# Patient Record
Sex: MALE | Race: WHITE | Employment: FULL TIME | URBAN - METROPOLITAN AREA
[De-identification: names, ages, dates, MRNs, and addresses within clinical notes are randomized per-mention and may not be internally consistent; named-entity substitution may affect disease eponyms.]

---

## 2018-05-30 ENCOUNTER — OFFICE VISIT (OUTPATIENT)
Dept: FAMILY MEDICINE CLINIC | Facility: CLINIC | Age: 43
End: 2018-05-30

## 2018-05-30 VITALS
WEIGHT: 184 LBS | SYSTOLIC BLOOD PRESSURE: 122 MMHG | DIASTOLIC BLOOD PRESSURE: 74 MMHG | BODY MASS INDEX: 23.61 KG/M2 | HEART RATE: 76 BPM | OXYGEN SATURATION: 98 % | HEIGHT: 74 IN

## 2018-05-30 DIAGNOSIS — E78.2 MIXED HYPERLIPIDEMIA: ICD-10-CM

## 2018-05-30 DIAGNOSIS — I10 HYPERTENSION WITH GOAL BLOOD PRESSURE LESS THAN 140/90: Primary | ICD-10-CM

## 2018-05-30 DIAGNOSIS — F41.9 ANXIETY: ICD-10-CM

## 2018-05-30 DIAGNOSIS — Z87.891 PERSONAL HISTORY OF TOBACCO USE, PRESENTING HAZARDS TO HEALTH: ICD-10-CM

## 2018-05-30 PROBLEM — M19.071 ARTHRITIS OF RIGHT FOOT: Status: ACTIVE | Noted: 2018-05-30

## 2018-05-30 PROCEDURE — 99202 OFFICE O/P NEW SF 15 MIN: CPT | Performed by: FAMILY MEDICINE

## 2018-05-30 RX ORDER — HYDROCHLOROTHIAZIDE 12.5 MG/1
12.5 CAPSULE, GELATIN COATED ORAL
Refills: 2 | COMMUNITY
Start: 2018-05-15 | End: 2018-08-06

## 2018-05-30 RX ORDER — ALPRAZOLAM 0.5 MG/1
0.5 TABLET ORAL DAILY PRN
Qty: 14 TABLET | Refills: 0 | OUTPATIENT
Start: 2018-05-30 | End: 2019-04-14

## 2018-05-30 RX ORDER — ATORVASTATIN CALCIUM 10 MG/1
10 TABLET, FILM COATED ORAL
Refills: 11 | COMMUNITY
Start: 2018-05-11 | End: 2018-12-31

## 2018-05-30 RX ORDER — METOPROLOL SUCCINATE 50 MG/1
75 TABLET, EXTENDED RELEASE ORAL DAILY
Refills: 2 | COMMUNITY
Start: 2018-04-13 | End: 2018-10-30

## 2018-05-30 NOTE — PROGRESS NOTES
CC:  Patient presents with:  Establish Care      HPI: 37year old male smoker with hx of HTN, anxiety, and HLD here to establish care. Previous PCP was [facility] but switching jobs and will not be downtown as often.   Has been on Sertraline for Current Outpatient Prescriptions:  ALPRAZolam 0.5 MG Oral Tab Take 1 tablet (0.5 mg total) by mouth daily as needed for Anxiety. Disp: 14 tablet Rfl: 0   Metoprolol Succinate ER 50 MG Oral Tablet 24 Hr Take 75 mg by mouth daily.  Takes 1.5 tablets o provided, and will discuss cessation with therapist as well    Return in 1 month for physical or sooner as needed.      Gin Washington DO  05/30/18  8:47 AM

## 2018-07-09 ENCOUNTER — TELEPHONE (OUTPATIENT)
Dept: FAMILY MEDICINE CLINIC | Facility: CLINIC | Age: 43
End: 2018-07-09

## 2018-07-09 RX ORDER — METOPROLOL SUCCINATE 50 MG/1
TABLET, EXTENDED RELEASE ORAL
Qty: 45 TABLET | Refills: 0 | Status: SHIPPED | OUTPATIENT
Start: 2018-07-09 | End: 2018-10-06

## 2018-07-09 RX ORDER — HYDROCHLOROTHIAZIDE 12.5 MG/1
12.5 TABLET ORAL DAILY
Qty: 30 TABLET | Refills: 0 | Status: SHIPPED | OUTPATIENT
Start: 2018-07-09 | End: 2018-08-05

## 2018-07-09 RX ORDER — ATORVASTATIN CALCIUM 10 MG/1
10 TABLET, FILM COATED ORAL NIGHTLY
Qty: 30 TABLET | Refills: 0 | Status: SHIPPED | OUTPATIENT
Start: 2018-07-09 | End: 2018-11-27

## 2018-07-09 NOTE — TELEPHONE ENCOUNTER
Requesting refill on hydrochlorothiazide 12.5 mg, atorvastatin 10mg, sertaline 50 mg , metoprolol 50 mg

## 2018-07-09 NOTE — TELEPHONE ENCOUNTER
Pt requesting refills on HCTZ, Atorvastatin, Metoprolol and Sertraline. Sent in HCTZ, Atorvastatin and Metoprolol for 30 day supply of each per Dr. Elise Montes De Oca. Pt is due for a physical and labs. LM for pt letting him know. Dr. Elise Montes De Oca will send in Sertraline.

## 2018-08-06 RX ORDER — HYDROCHLOROTHIAZIDE 12.5 MG/1
TABLET ORAL
Qty: 60 TABLET | Refills: 0 | Status: SHIPPED | OUTPATIENT
Start: 2018-08-06 | End: 2018-10-04

## 2018-10-04 RX ORDER — HYDROCHLOROTHIAZIDE 12.5 MG/1
TABLET ORAL
Qty: 30 TABLET | Refills: 0 | Status: SHIPPED | OUTPATIENT
Start: 2018-10-04 | End: 2018-10-30

## 2018-10-08 RX ORDER — METOPROLOL SUCCINATE 50 MG/1
TABLET, EXTENDED RELEASE ORAL
Qty: 90 TABLET | Refills: 0 | Status: SHIPPED | OUTPATIENT
Start: 2018-10-08 | End: 2018-12-01

## 2018-10-30 RX ORDER — HYDROCHLOROTHIAZIDE 12.5 MG/1
TABLET ORAL
Qty: 30 TABLET | Refills: 0 | Status: SHIPPED | OUTPATIENT
Start: 2018-10-30 | End: 2018-12-01

## 2018-10-30 NOTE — TELEPHONE ENCOUNTER
Refill request rec'd for HCTZ 12.5mg/day. Per Dr. Reddy Dowling ok to send I #30 only. Pt to have labs drawn and schedule an appt for a physical. LM on pts personal VM informing him of above. Refill sent in for pt.

## 2018-11-27 ENCOUNTER — APPOINTMENT (OUTPATIENT)
Dept: LAB | Facility: REFERENCE LAB | Age: 43
End: 2018-11-27
Attending: FAMILY MEDICINE
Payer: COMMERCIAL

## 2018-11-27 ENCOUNTER — OFFICE VISIT (OUTPATIENT)
Dept: FAMILY MEDICINE CLINIC | Facility: CLINIC | Age: 43
End: 2018-11-27
Payer: COMMERCIAL

## 2018-11-27 VITALS
HEART RATE: 90 BPM | WEIGHT: 189 LBS | HEIGHT: 74 IN | BODY MASS INDEX: 24.26 KG/M2 | SYSTOLIC BLOOD PRESSURE: 138 MMHG | OXYGEN SATURATION: 98 % | DIASTOLIC BLOOD PRESSURE: 84 MMHG

## 2018-11-27 DIAGNOSIS — Z00.00 ENCOUNTER FOR ROUTINE ADULT HEALTH EXAMINATION WITHOUT ABNORMAL FINDINGS: ICD-10-CM

## 2018-11-27 DIAGNOSIS — Z00.00 ENCOUNTER FOR ROUTINE ADULT HEALTH EXAMINATION WITHOUT ABNORMAL FINDINGS: Primary | ICD-10-CM

## 2018-11-27 DIAGNOSIS — Z12.5 ENCOUNTER FOR PROSTATE CANCER SCREENING: ICD-10-CM

## 2018-11-27 DIAGNOSIS — I10 HYPERTENSION WITH GOAL BLOOD PRESSURE LESS THAN 140/90: ICD-10-CM

## 2018-11-27 DIAGNOSIS — E78.2 MIXED HYPERLIPIDEMIA: ICD-10-CM

## 2018-11-27 DIAGNOSIS — Z12.11 COLON CANCER SCREENING: ICD-10-CM

## 2018-11-27 PROCEDURE — 80053 COMPREHEN METABOLIC PANEL: CPT

## 2018-11-27 PROCEDURE — 99396 PREV VISIT EST AGE 40-64: CPT | Performed by: FAMILY MEDICINE

## 2018-11-27 PROCEDURE — 80061 LIPID PANEL: CPT

## 2018-11-27 PROCEDURE — 36415 COLL VENOUS BLD VENIPUNCTURE: CPT

## 2018-11-27 PROCEDURE — 83036 HEMOGLOBIN GLYCOSYLATED A1C: CPT

## 2018-11-27 NOTE — PROGRESS NOTES
Eva Alonzo is a 37year old male who presents for a complete physical exam.   HPI:     Concerns: None, doing very well with his anxiety levels since seeing a therapist in [facility].  Also cut back on smoking to 1 cigarette a day and very close to quitt Hypertension Father    • Heart Attack Father 72        CABG   • Arthritis Mother    • Cancer Maternal Grandmother         Stomach      Social History:  Social History    Tobacco Use      Smoking status: Current Every Day Smoker        Packs/day: 0.00 most days of the week   -Importance of regular exercise and weight maintenance   -Smoking cessation   -Diabetes screening which he desires  -Cholesterol screening which he desires  -Risks and benefits of Aspirin to prevent heart attacks and colon cancer

## 2018-11-29 DIAGNOSIS — Z12.5 PROSTATE CANCER SCREENING: ICD-10-CM

## 2018-11-29 DIAGNOSIS — R74.8 ELEVATED LIVER ENZYMES: ICD-10-CM

## 2018-11-29 DIAGNOSIS — E78.2 MIXED HYPERLIPIDEMIA: Primary | ICD-10-CM

## 2018-12-03 RX ORDER — METOPROLOL SUCCINATE 50 MG/1
TABLET, EXTENDED RELEASE ORAL
Qty: 90 TABLET | Refills: 0 | Status: SHIPPED | OUTPATIENT
Start: 2018-12-03 | End: 2019-02-16

## 2018-12-03 RX ORDER — HYDROCHLOROTHIAZIDE 12.5 MG/1
TABLET ORAL
Qty: 30 TABLET | Refills: 0 | Status: SHIPPED | OUTPATIENT
Start: 2018-12-03 | End: 2019-02-03

## 2018-12-17 ENCOUNTER — PATIENT MESSAGE (OUTPATIENT)
Dept: FAMILY MEDICINE CLINIC | Facility: CLINIC | Age: 43
End: 2018-12-17

## 2018-12-17 ENCOUNTER — TELEPHONE (OUTPATIENT)
Dept: FAMILY MEDICINE CLINIC | Facility: CLINIC | Age: 43
End: 2018-12-17

## 2018-12-17 NOTE — TELEPHONE ENCOUNTER
Patient calling with Pharmacy information states will like it be to be sent to  [address] [facility] on  [address] Telephone: [phone]

## 2018-12-17 NOTE — TELEPHONE ENCOUNTER
Sent pt a [employer] message (time difference in [place] is 6 hours) stating we will call the pharmacy tomorrow to try to get the medication called in for him, per [employer] [facility] protocol.

## 2018-12-17 NOTE — TELEPHONE ENCOUNTER
Patient is traveling in [place] right now and due to come back on Thursday. Patient forgot his METOPROLOL SUCCINATE ER 50 MG Oral Tablet 24 Hr at home and wants to know if he should tough it out or try to get it filled.

## 2018-12-17 NOTE — TELEPHONE ENCOUNTER
ANA for pt to contact the office with a phone # of a local pharmacy in [place] to call in his Metoprolol.

## 2018-12-18 NOTE — TELEPHONE ENCOUNTER
From: Keenan Bowie RN  To: Mirian Bledsoe  Sent: 12/17/2018 4:44 PM CST  Subject: prescription    Hi Mr. Princess Cordero    I wanted to let you know that we are trying to get your BP medication filled for you in [place].  We will call the [address] [place] [address],[address] number

## 2018-12-19 NOTE — PROGRESS NOTES
Phone call to patient to notify we are still attempting to contact the pharmacy. Pt stated one of his work colleagues has the exact same BP medication and dose and has extra he brought with him on their trip-.  Pt states he will use this and does not need a

## 2018-12-31 RX ORDER — ATORVASTATIN CALCIUM 10 MG/1
TABLET, FILM COATED ORAL
Qty: 90 TABLET | Refills: 0 | Status: SHIPPED | OUTPATIENT
Start: 2018-12-31 | End: 2019-04-04

## 2019-02-05 RX ORDER — HYDROCHLOROTHIAZIDE 12.5 MG/1
TABLET ORAL
Qty: 30 TABLET | Refills: 0 | Status: SHIPPED | OUTPATIENT
Start: 2019-02-05 | End: 2019-03-13

## 2019-02-20 RX ORDER — METOPROLOL SUCCINATE 50 MG/1
TABLET, EXTENDED RELEASE ORAL
Qty: 90 TABLET | Refills: 0 | Status: SHIPPED | OUTPATIENT
Start: 2019-02-20 | End: 2019-05-03

## 2019-03-08 RX ORDER — HYDROCHLOROTHIAZIDE 12.5 MG/1
TABLET ORAL
Qty: 30 TABLET | Refills: 0 | OUTPATIENT
Start: 2019-03-08

## 2019-03-08 NOTE — TELEPHONE ENCOUNTER
Spoke with pharmD who stated initially pharmacy could not refill Rx for Metoprolol till 3/19 but he checked again and is able to fill it today. Pt notified.  No refill needed for HCTZ per pt at this time

## 2019-03-14 RX ORDER — HYDROCHLOROTHIAZIDE 12.5 MG/1
TABLET ORAL
Qty: 30 TABLET | Refills: 0 | Status: SHIPPED | OUTPATIENT
Start: 2019-03-14 | End: 2019-04-14

## 2019-04-05 NOTE — TELEPHONE ENCOUNTER
Requested Prescriptions     Pending Prescriptions Disp Refills   • ATORVASTATIN 10 MG Oral Tab [Pharmacy Med Name: ATORVASTATIN 10 MG TABLET] 90 tablet 0     Sig: TAKE 1 TABLET BY MOUTH EVERY DAY AT NIGHT     LOV 11/27/2018    Last filled 12/31/2018   90 t

## 2019-04-06 RX ORDER — ATORVASTATIN CALCIUM 10 MG/1
TABLET, FILM COATED ORAL
Qty: 90 TABLET | Refills: 0 | Status: SHIPPED | OUTPATIENT
Start: 2019-04-06 | End: 2019-07-02

## 2019-04-15 RX ORDER — ALPRAZOLAM 0.5 MG/1
0.5 TABLET ORAL DAILY PRN
Qty: 14 TABLET | Refills: 0 | OUTPATIENT
Start: 2019-04-15 | End: 2019-07-02

## 2019-04-15 RX ORDER — HYDROCHLOROTHIAZIDE 12.5 MG/1
12.5 TABLET ORAL
Qty: 30 TABLET | Refills: 0 | Status: SHIPPED | OUTPATIENT
Start: 2019-04-15 | End: 2019-05-10

## 2019-05-04 RX ORDER — METOPROLOL SUCCINATE 50 MG/1
TABLET, EXTENDED RELEASE ORAL
Qty: 90 TABLET | Refills: 0 | Status: SHIPPED | OUTPATIENT
Start: 2019-05-04 | End: 2019-05-31

## 2019-05-09 NOTE — TELEPHONE ENCOUNTER
A refill request was received for:  Requested Prescriptions     Pending Prescriptions Disp Refills   • SERTRALINE HCL 50 MG Oral Tab [Pharmacy Med Name: SERTRALINE HCL 50 MG TABLET] 90 tablet 0     Sig: TAKE 1 TABLET BY MOUTH EVERY DAY     Last refill date

## 2019-05-10 RX ORDER — HYDROCHLOROTHIAZIDE 12.5 MG/1
TABLET ORAL
Qty: 30 TABLET | Refills: 0 | Status: SHIPPED | OUTPATIENT
Start: 2019-05-10 | End: 2019-06-09

## 2019-05-31 RX ORDER — METOPROLOL SUCCINATE 50 MG/1
TABLET, EXTENDED RELEASE ORAL
Qty: 90 TABLET | Refills: 0 | Status: SHIPPED | OUTPATIENT
Start: 2019-05-31 | End: 2019-07-02

## 2019-06-10 RX ORDER — HYDROCHLOROTHIAZIDE 12.5 MG/1
TABLET ORAL
Qty: 60 TABLET | Refills: 0 | Status: SHIPPED | OUTPATIENT
Start: 2019-06-10 | End: 2019-07-02

## 2019-07-02 DIAGNOSIS — I10 HYPERTENSION WITH GOAL BLOOD PRESSURE LESS THAN 140/90: Primary | ICD-10-CM

## 2019-07-02 RX ORDER — ATORVASTATIN CALCIUM 10 MG/1
TABLET, FILM COATED ORAL
Qty: 30 TABLET | Refills: 0 | Status: SHIPPED | OUTPATIENT
Start: 2019-07-02 | End: 2019-08-04

## 2019-07-02 RX ORDER — METOPROLOL SUCCINATE 50 MG/1
TABLET, EXTENDED RELEASE ORAL
Qty: 45 TABLET | Refills: 0 | Status: SHIPPED | OUTPATIENT
Start: 2019-07-02 | End: 2019-08-04

## 2019-07-02 NOTE — TELEPHONE ENCOUNTER
A refill request was received for:  Requested Prescriptions     Pending Prescriptions Disp Refills   • Metoprolol Succinate ER 50 MG Oral Tablet 24 Hr 90 tablet 0     Sig: Take 1 and 1/2 tabs po daily totalling 75mg     Last refill date: 5/31/2019  Qty:120

## 2019-07-03 RX ORDER — ALPRAZOLAM 0.5 MG/1
0.5 TABLET ORAL DAILY PRN
Qty: 12 TABLET | Refills: 0 | Status: SHIPPED
Start: 2019-07-03 | End: 2019-12-03

## 2019-07-03 RX ORDER — HYDROCHLOROTHIAZIDE 12.5 MG/1
12.5 TABLET ORAL
Qty: 60 TABLET | Refills: 0 | Status: SHIPPED | OUTPATIENT
Start: 2019-07-03 | End: 2019-08-15

## 2019-07-10 RX ORDER — METOPROLOL SUCCINATE 50 MG/1
TABLET, EXTENDED RELEASE ORAL
Qty: 45 TABLET | Refills: 0 | Status: SHIPPED | OUTPATIENT
Start: 2019-07-10 | End: 2019-12-03 | Stop reason: ALTCHOICE

## 2019-08-04 DIAGNOSIS — I10 HYPERTENSION WITH GOAL BLOOD PRESSURE LESS THAN 140/90: ICD-10-CM

## 2019-08-06 NOTE — TELEPHONE ENCOUNTER
A refill request was received for:  Requested Prescriptions     Pending Prescriptions Disp Refills   • Metoprolol Succinate ER 50 MG Oral Tablet 24 Hr [Pharmacy Med Name: METOPROLOL SUCC ER 50 MG TAB] 45 tablet 0     Sig: TAKE 1 AND 1/2 TABS BY MOUTH DAILY

## 2019-08-07 RX ORDER — ATORVASTATIN CALCIUM 10 MG/1
TABLET, FILM COATED ORAL
Qty: 90 TABLET | Refills: 0 | Status: SHIPPED | OUTPATIENT
Start: 2019-08-07 | End: 2019-08-15

## 2019-08-07 RX ORDER — METOPROLOL SUCCINATE 50 MG/1
TABLET, EXTENDED RELEASE ORAL
Qty: 135 TABLET | Refills: 0 | Status: SHIPPED | OUTPATIENT
Start: 2019-08-07 | End: 2019-08-15

## 2019-08-14 DIAGNOSIS — I10 HYPERTENSION WITH GOAL BLOOD PRESSURE LESS THAN 140/90: ICD-10-CM

## 2019-08-14 NOTE — TELEPHONE ENCOUNTER
Okay to fill all medications as requested and also schedule follow-up visit within the next 1-2 months. Please give enough to last until his visit.

## 2019-08-14 NOTE — TELEPHONE ENCOUNTER
Pt requesting refills of the following meds listed below. Explained to pt that he is overdue for f/u with [facility]. Pt stated he will call back this afternoon once he gets off the airplane to schedule appmnt.  explained to pt that this RN will check with [facility] r/t r

## 2019-08-15 RX ORDER — METOPROLOL SUCCINATE 50 MG/1
TABLET, EXTENDED RELEASE ORAL
Qty: 135 TABLET | Refills: 0 | Status: SHIPPED | OUTPATIENT
Start: 2019-08-15 | End: 2019-09-16

## 2019-08-15 RX ORDER — ATORVASTATIN CALCIUM 10 MG/1
TABLET, FILM COATED ORAL
Qty: 90 TABLET | Refills: 0 | Status: SHIPPED | OUTPATIENT
Start: 2019-08-15 | End: 2019-11-17

## 2019-08-15 RX ORDER — HYDROCHLOROTHIAZIDE 12.5 MG/1
12.5 TABLET ORAL
Qty: 90 TABLET | Refills: 0 | Status: SHIPPED | OUTPATIENT
Start: 2019-08-15 | End: 2019-12-03

## 2019-08-15 NOTE — TELEPHONE ENCOUNTER
Pt scheduled for 9/18/19 for f/u for BP and anxiety with [facility]. meds refilled incl:    Outpatient Medication Detail      Disp Refills Start End    ALPRAZolam 0.5 MG Oral Tab 12 tablet 0 7/3/2019     Sig - Route:  Take 1 tablet (0.5 mg total) by mouth daily as

## 2019-09-16 DIAGNOSIS — I10 HYPERTENSION WITH GOAL BLOOD PRESSURE LESS THAN 140/90: ICD-10-CM

## 2019-09-16 RX ORDER — METOPROLOL SUCCINATE 50 MG/1
TABLET, EXTENDED RELEASE ORAL
Qty: 135 TABLET | Refills: 0 | Status: SHIPPED | OUTPATIENT
Start: 2019-09-16 | End: 2019-12-03

## 2019-11-18 RX ORDER — ATORVASTATIN CALCIUM 10 MG/1
TABLET, FILM COATED ORAL
Qty: 90 TABLET | Refills: 0 | Status: SHIPPED | OUTPATIENT
Start: 2019-11-18 | End: 2019-12-05

## 2019-11-18 NOTE — TELEPHONE ENCOUNTER
A refill request was received for:  Requested Prescriptions     Pending Prescriptions Disp Refills   • atorvastatin 10 MG Oral Tab 90 tablet 0     Sig: TAKE 1 TABLET BY MOUTH EVERY DAY AT NIGHT   • Sertraline HCl 50 MG Oral Tab 90 tablet 0     Sig: Take 1

## 2019-12-03 ENCOUNTER — LAB ENCOUNTER (OUTPATIENT)
Dept: LAB | Facility: REFERENCE LAB | Age: 44
End: 2019-12-03
Attending: FAMILY MEDICINE
Payer: COMMERCIAL

## 2019-12-03 ENCOUNTER — OFFICE VISIT (OUTPATIENT)
Dept: FAMILY MEDICINE CLINIC | Facility: CLINIC | Age: 44
End: 2019-12-03
Payer: COMMERCIAL

## 2019-12-03 VITALS
HEIGHT: 74 IN | SYSTOLIC BLOOD PRESSURE: 138 MMHG | DIASTOLIC BLOOD PRESSURE: 86 MMHG | BODY MASS INDEX: 24.9 KG/M2 | HEART RATE: 80 BPM | WEIGHT: 194 LBS | OXYGEN SATURATION: 98 %

## 2019-12-03 DIAGNOSIS — Z30.09 VASECTOMY EVALUATION: ICD-10-CM

## 2019-12-03 DIAGNOSIS — Z00.01 ENCOUNTER FOR ROUTINE ADULT HEALTH EXAMINATION WITH ABNORMAL FINDINGS: Primary | ICD-10-CM

## 2019-12-03 DIAGNOSIS — Z23 NEED FOR VACCINATION: ICD-10-CM

## 2019-12-03 DIAGNOSIS — Z00.01 ENCOUNTER FOR ROUTINE ADULT HEALTH EXAMINATION WITH ABNORMAL FINDINGS: ICD-10-CM

## 2019-12-03 DIAGNOSIS — M19.071 ARTHRITIS OF RIGHT FOOT: ICD-10-CM

## 2019-12-03 DIAGNOSIS — I10 HYPERTENSION WITH GOAL BLOOD PRESSURE LESS THAN 140/90: ICD-10-CM

## 2019-12-03 DIAGNOSIS — F41.9 ANXIETY: ICD-10-CM

## 2019-12-03 PROCEDURE — 80061 LIPID PANEL: CPT

## 2019-12-03 PROCEDURE — 90686 IIV4 VACC NO PRSV 0.5 ML IM: CPT | Performed by: FAMILY MEDICINE

## 2019-12-03 PROCEDURE — 99396 PREV VISIT EST AGE 40-64: CPT | Performed by: FAMILY MEDICINE

## 2019-12-03 PROCEDURE — 83036 HEMOGLOBIN GLYCOSYLATED A1C: CPT

## 2019-12-03 PROCEDURE — 80053 COMPREHEN METABOLIC PANEL: CPT

## 2019-12-03 PROCEDURE — 99214 OFFICE O/P EST MOD 30 MIN: CPT | Performed by: FAMILY MEDICINE

## 2019-12-03 PROCEDURE — 36415 COLL VENOUS BLD VENIPUNCTURE: CPT

## 2019-12-03 PROCEDURE — 85025 COMPLETE CBC W/AUTO DIFF WBC: CPT

## 2019-12-03 PROCEDURE — 90471 IMMUNIZATION ADMIN: CPT | Performed by: FAMILY MEDICINE

## 2019-12-03 RX ORDER — HYDROCHLOROTHIAZIDE 12.5 MG/1
12.5 TABLET ORAL
Qty: 90 TABLET | Refills: 1 | Status: SHIPPED | OUTPATIENT
Start: 2019-12-03 | End: 2020-02-18

## 2019-12-03 RX ORDER — ALPRAZOLAM 0.5 MG/1
0.5 TABLET ORAL DAILY PRN
Qty: 30 TABLET | Refills: 0 | Status: SHIPPED | OUTPATIENT
Start: 2019-12-03 | End: 2019-12-24

## 2019-12-03 RX ORDER — METOPROLOL SUCCINATE 50 MG/1
TABLET, EXTENDED RELEASE ORAL
Qty: 135 TABLET | Refills: 1 | Status: SHIPPED | OUTPATIENT
Start: 2019-12-03 | End: 2019-12-24

## 2019-12-03 NOTE — PROGRESS NOTES
Mt Bentley is a 53year old male who presents for a complete physical exam.   HPI:     Concerns: Anxiety has been higher but has not been able to see his therapist as often and he is traveling more.  Does feel the Sertraline 50 mg daily is working wel TAKE 1 TABLET BY MOUTH EVERY DAY AT NIGHT 90 tablet 0      Past Medical History:   Diagnosis Date   • Arthritis     Right foot   • Essential hypertension    • High cholesterol    • Sarcoidosis 2008   • Stress       Past Surgical History:   Procedure Rhtet Ron research information, contact Dr. Karis Weber at [email or web address]. PRIME-MD® is a trademark of [address]. All rights reserved. Reproduced with permission.       EXAM:   Wt Readings from Last 6 Encounters:  12/03/19 : 194 lb (88 kg) OA. Also suggest Turmeric 1,000 mg twice a day instead of NSAID's for pain if possible. Anxiety okay and improving per patient. Needs to see his therapist more often and will continue Sertraline 50 mg daily for now.  Also given short-term refill of Alprazo Take 1 tablet (0.5 mg total) by mouth daily as needed for Anxiety. Imaging & Consults:  UROLOGY - INTERNAL  PODIATRY - INTERNAL  FLULAVAL INFLUENZA VACCINE QUAD PRESERVATIVE FREE 0.5 ML    Return in 1 year for annual physical or sooner as needed.

## 2019-12-03 NOTE — PATIENT INSTRUCTIONS
-Try taking Turmeric 1,000 mg twice a day  -Consider CoQ10 200 mg daily to help prevent muscle cramping with statin medication   Prevention Guidelines, Men Ages 36 to 52  Screening tests and vaccines are an important part of managing your health.  A screeni rectal exam (GERONIMO) and prostate-specific antigen (PSA) screening1 At routine exams   Syphilis Men at increased risk for infection – talk with your healthcare provider At routine exams   Tuberculosis Men at increased risk for infection – talk with your healt then Td every 10 years   Counseling Who needs it How often   Diet and exercise Men who are overweight or obese When diagnosed, and then at routine exams   Sexually transmitted infection prevention Men at increased risk for infection – talk with your health vegetables, soups, and fish not labeled as no-salt-added or reduced sodium  · Packaged gravies and sauces  · Olives, pickles, and relish  · Bottled salad dressings    For snacks and desserts  · Yogurt  · Unsalted, air-popped popcorn  · Unsalted nuts or see

## 2019-12-05 DIAGNOSIS — R79.89 ELEVATED LFTS: Primary | ICD-10-CM

## 2019-12-05 RX ORDER — ATORVASTATIN CALCIUM 20 MG/1
TABLET, FILM COATED ORAL
Qty: 90 TABLET | Refills: 1 | Status: SHIPPED | OUTPATIENT
Start: 2019-12-05 | End: 2020-02-18

## 2019-12-24 DIAGNOSIS — I10 HYPERTENSION WITH GOAL BLOOD PRESSURE LESS THAN 140/90: ICD-10-CM

## 2019-12-26 RX ORDER — ALPRAZOLAM 0.5 MG/1
0.5 TABLET ORAL DAILY PRN
Qty: 15 TABLET | Refills: 0 | Status: SHIPPED | OUTPATIENT
Start: 2019-12-26 | End: 2020-03-12

## 2019-12-26 RX ORDER — METOPROLOL SUCCINATE 50 MG/1
TABLET, EXTENDED RELEASE ORAL
Qty: 135 TABLET | Refills: 1 | Status: SHIPPED | OUTPATIENT
Start: 2019-12-26 | End: 2020-06-16

## 2020-02-17 RX ORDER — ATORVASTATIN CALCIUM 10 MG/1
TABLET, FILM COATED ORAL
Qty: 90 TABLET | Refills: 0 | OUTPATIENT
Start: 2020-02-17

## 2020-02-18 RX ORDER — HYDROCHLOROTHIAZIDE 12.5 MG/1
12.5 TABLET ORAL
Qty: 90 TABLET | Refills: 1 | Status: SHIPPED | OUTPATIENT
Start: 2020-02-18 | End: 2020-05-14

## 2020-02-18 RX ORDER — ATORVASTATIN CALCIUM 20 MG/1
TABLET, FILM COATED ORAL
Qty: 90 TABLET | Refills: 1 | Status: SHIPPED | OUTPATIENT
Start: 2020-02-18 | End: 2020-05-14

## 2020-03-13 RX ORDER — ALPRAZOLAM 0.5 MG/1
0.5 TABLET ORAL DAILY PRN
Qty: 30 TABLET | Refills: 0 | Status: SHIPPED | OUTPATIENT
Start: 2020-03-13 | End: 2020-11-19

## 2020-05-15 RX ORDER — HYDROCHLOROTHIAZIDE 12.5 MG/1
12.5 TABLET ORAL
Qty: 90 TABLET | Refills: 1 | Status: SHIPPED | OUTPATIENT
Start: 2020-05-15 | End: 2020-11-19

## 2020-05-15 RX ORDER — ATORVASTATIN CALCIUM 20 MG/1
TABLET, FILM COATED ORAL
Qty: 90 TABLET | Refills: 0 | Status: SHIPPED | OUTPATIENT
Start: 2020-05-15 | End: 2020-11-19

## 2020-05-15 NOTE — TELEPHONE ENCOUNTER
A refill request was received for:  Requested Prescriptions     Pending Prescriptions Disp Refills   • atorvastatin 20 MG Oral Tab 90 tablet 1     Sig: TAKE 1 TABLET BY MOUTH EVERY NIGHT     Signed Prescriptions Disp Refills   • Sertraline HCl 50 MG Oral T

## 2020-06-08 ENCOUNTER — TELEMEDICINE (OUTPATIENT)
Dept: FAMILY MEDICINE CLINIC | Facility: CLINIC | Age: 45
End: 2020-06-08
Payer: COMMERCIAL

## 2020-06-08 VITALS — HEART RATE: 70 BPM

## 2020-06-08 DIAGNOSIS — F41.9 ANXIETY: ICD-10-CM

## 2020-06-08 DIAGNOSIS — E78.2 MIXED HYPERLIPIDEMIA: ICD-10-CM

## 2020-06-08 DIAGNOSIS — I10 HYPERTENSION WITH GOAL BLOOD PRESSURE LESS THAN 140/90: ICD-10-CM

## 2020-06-08 DIAGNOSIS — R74.8 ELEVATED LIVER ENZYMES: Primary | ICD-10-CM

## 2020-06-08 PROCEDURE — 99213 OFFICE O/P EST LOW 20 MIN: CPT | Performed by: FAMILY MEDICINE

## 2020-06-08 NOTE — PATIENT INSTRUCTIONS
Controlling Your Cholesterol  Cholesterol is a waxy substance. It travels in your blood through the blood vessels. When you have high cholesterol, it can build up along the walls of the blood vessels.  This makes the vessels narrower and decreases blood f · Eat about two, 3.5 ounce servings of non-fried fish such as salmon, herring, sardines or mackerel per week . Most fish contain omega-3 fatty acids. These help lower total blood cholesterol.  Omega-3 fatty acids lowers triglyceride levels, another form of © 0431-1769 [address]. [address] [facility], [facility] [place]. All rights reserved. This information is not intended as a substitute for professional medical care. Always follow your healthcare professional's instructions.

## 2020-06-16 DIAGNOSIS — I10 HYPERTENSION WITH GOAL BLOOD PRESSURE LESS THAN 140/90: ICD-10-CM

## 2020-06-16 RX ORDER — METOPROLOL SUCCINATE 50 MG/1
TABLET, EXTENDED RELEASE ORAL
Qty: 135 TABLET | Refills: 1 | Status: SHIPPED | OUTPATIENT
Start: 2020-06-16 | End: 2020-07-03

## 2020-06-16 NOTE — TELEPHONE ENCOUNTER
A refill request was received for:  Requested Prescriptions     Pending Prescriptions Disp Refills   • METOPROLOL SUCCINATE ER 50 MG Oral Tablet 24 Hr [Pharmacy Med Name: METOPROLOL SUCC ER 50 MG TAB] 135 tablet 1     Sig: TAKE 1 AND 1/2 TABS BY MOUTH Jp Sanders

## 2020-07-03 DIAGNOSIS — I10 HYPERTENSION WITH GOAL BLOOD PRESSURE LESS THAN 140/90: ICD-10-CM

## 2020-07-04 RX ORDER — METOPROLOL SUCCINATE 50 MG/1
75 TABLET, EXTENDED RELEASE ORAL DAILY
Qty: 135 TABLET | Refills: 1 | Status: SHIPPED | OUTPATIENT
Start: 2020-07-04 | End: 2021-04-07

## 2020-07-04 NOTE — TELEPHONE ENCOUNTER
Received page requesting medication refill as he runs out tomorrow, even though it was refilled 6/16. Sent to [facility] on file again.

## 2020-08-14 ENCOUNTER — TELEPHONE (OUTPATIENT)
Dept: FAMILY MEDICINE CLINIC | Facility: CLINIC | Age: 45
End: 2020-08-14

## 2020-08-14 NOTE — TELEPHONE ENCOUNTER
Patient requesting refill on medications Metoprolol Succinate ER 50 MG Oral Tablet 24 Hr  Sertraline HCl 50 MG Oral Tab  atorvastatin 20 MG Oral Tab  hydrochlorothiazide 12.5 MG Oral Tab

## 2020-08-14 NOTE — TELEPHONE ENCOUNTER
Pt is needing refill       Metoprolol Succinate ER 50 MG Oral Tablet 24 Hr      [id number] [place], [place] - [address] AT [facility], [phone], [phone]

## 2020-08-14 NOTE — TELEPHONE ENCOUNTER
Called Pharmacy to verify medication refill needed and per pharmacy medication is too soon to fill. Insurance will not pay because it is too early. Called pt and informed of pharmacy's statement. Per pt will call the pharmacy.

## 2020-08-14 NOTE — TELEPHONE ENCOUNTER
Called pharmacy and pt has refills of all meds seen below including Atorvastatin.       Called pt and informed of refills, advised to check on the Atorvastatin, also provided appointment with Dr. Nasim Giron on Aug 20, 2020 at 0830 am  Pt verbalized understandin

## 2020-10-07 ENCOUNTER — TELEPHONE (OUTPATIENT)
Dept: FAMILY MEDICINE CLINIC | Facility: CLINIC | Age: 45
End: 2020-10-07

## 2020-10-07 NOTE — TELEPHONE ENCOUNTER
Metoprolol Succinate ER 50 MG Oral Tablet 24 Hr 135 tablet 1 7/4/2020    Sig:   Take 1.5 tablets (75 mg total) by mouth daily.      Route: Kirt Prima     Order #:   [id number]           Last visit 06/08/20 AS

## 2020-11-19 RX ORDER — ATORVASTATIN CALCIUM 20 MG/1
TABLET, FILM COATED ORAL
Qty: 90 TABLET | Refills: 0 | OUTPATIENT
Start: 2020-11-19

## 2020-11-19 RX ORDER — ALPRAZOLAM 0.5 MG/1
0.5 TABLET ORAL DAILY PRN
Qty: 30 TABLET | Refills: 0 | OUTPATIENT
Start: 2020-11-19

## 2020-11-19 RX ORDER — HYDROCHLOROTHIAZIDE 12.5 MG/1
12.5 TABLET ORAL
Qty: 90 TABLET | Refills: 1 | OUTPATIENT
Start: 2020-11-19

## 2020-11-20 RX ORDER — ALPRAZOLAM 0.5 MG/1
0.5 TABLET ORAL DAILY PRN
Qty: 30 TABLET | Refills: 0 | Status: SHIPPED | OUTPATIENT
Start: 2020-11-20 | End: 2021-10-12

## 2020-11-20 RX ORDER — HYDROCHLOROTHIAZIDE 12.5 MG/1
12.5 TABLET ORAL
Qty: 30 TABLET | Refills: 0 | Status: CANCELLED | OUTPATIENT
Start: 2020-11-20

## 2020-11-20 RX ORDER — ALPRAZOLAM 0.5 MG/1
0.5 TABLET ORAL DAILY PRN
Qty: 30 TABLET | Refills: 0 | Status: CANCELLED | OUTPATIENT
Start: 2020-11-20

## 2020-11-20 RX ORDER — ATORVASTATIN CALCIUM 20 MG/1
TABLET, FILM COATED ORAL
Qty: 30 TABLET | Refills: 0 | Status: SHIPPED | OUTPATIENT
Start: 2020-11-20

## 2020-11-20 RX ORDER — ATORVASTATIN CALCIUM 20 MG/1
TABLET, FILM COATED ORAL
Qty: 30 TABLET | Refills: 0 | Status: CANCELLED | OUTPATIENT
Start: 2020-11-20

## 2020-11-20 RX ORDER — HYDROCHLOROTHIAZIDE 12.5 MG/1
12.5 TABLET ORAL
Qty: 30 TABLET | Refills: 0 | Status: SHIPPED | OUTPATIENT
Start: 2020-11-20 | End: 2020-12-21

## 2020-11-20 NOTE — TELEPHONE ENCOUNTER
Called patient ans left a voicemail for him to set up an appointment for a physical before he runs out of medication again

## 2020-11-20 NOTE — TELEPHONE ENCOUNTER
Patient is needing refill         ALPRAZolam 0.5 MG Oral Tab      Sertraline HCl 50 MG Oral Tab      hydrochlorothiazide 12.5 MG Oral Tab      atorvastatin 20 MG Oral Tab        [id number]- [place], [place] - [address] [facility] AT [address] [place] [address],

## 2020-11-20 NOTE — TELEPHONE ENCOUNTER
Pt requesting refill on        ALPRAZolam 0.5 MG Oral Tab        Sertraline HCl 50 MG Oral Tab        hydrochlorothiazide 12.5 MG Oral Tab        atorvastatin 20 MG Oral Tab      MD filled rx for 30 days, requesting to see patient and complete labs before

## 2020-12-16 ENCOUNTER — TELEPHONE (OUTPATIENT)
Dept: FAMILY MEDICINE CLINIC | Facility: CLINIC | Age: 45
End: 2020-12-16

## 2020-12-16 RX ORDER — ATORVASTATIN CALCIUM 20 MG/1
TABLET, FILM COATED ORAL
Qty: 30 TABLET | Refills: 0 | OUTPATIENT
Start: 2020-12-16

## 2020-12-21 ENCOUNTER — TELEPHONE (OUTPATIENT)
Dept: FAMILY MEDICINE CLINIC | Facility: CLINIC | Age: 45
End: 2020-12-21

## 2020-12-21 DIAGNOSIS — Z00.01 ENCOUNTER FOR ROUTINE ADULT HEALTH EXAMINATION WITH ABNORMAL FINDINGS: Primary | ICD-10-CM

## 2020-12-21 RX ORDER — HYDROCHLOROTHIAZIDE 12.5 MG/1
TABLET ORAL
Qty: 90 TABLET | Refills: 1 | OUTPATIENT
Start: 2020-12-21

## 2020-12-21 RX ORDER — HYDROCHLOROTHIAZIDE 12.5 MG/1
12.5 TABLET ORAL
Qty: 30 TABLET | Refills: 0 | Status: SHIPPED | OUTPATIENT
Start: 2020-12-21 | End: 2021-03-17

## 2020-12-21 NOTE — TELEPHONE ENCOUNTER
Patient requesting refill on atorvastatin 20 MG Oral Tab,   hydrochlorothiazide 12.5 MG Oral Tab,Sertraline HCl 50 MG Oral Tab

## 2020-12-21 NOTE — TELEPHONE ENCOUNTER
Please notify patient I refilled HCTZ and Sertraline for 30 days only but can not refill statin until he completes his blood work. His liver enzymes were high a year ago and could be dangerous to keep him on the statin without checking these.  As soon as he completes the blood work I can refill but please also schedule him for his physical. I also ordered the other labs he would need for his physical.

## 2021-03-17 RX ORDER — HYDROCHLOROTHIAZIDE 12.5 MG/1
12.5 TABLET ORAL DAILY
Qty: 60 TABLET | Refills: 0 | Status: SHIPPED | OUTPATIENT
Start: 2021-03-17 | End: 2021-05-11

## 2021-04-07 DIAGNOSIS — I10 HYPERTENSION WITH GOAL BLOOD PRESSURE LESS THAN 140/90: ICD-10-CM

## 2021-04-07 RX ORDER — METOPROLOL SUCCINATE 50 MG/1
75 TABLET, EXTENDED RELEASE ORAL DAILY
Qty: 45 TABLET | Refills: 0 | Status: SHIPPED | OUTPATIENT
Start: 2021-04-07 | End: 2021-05-11

## 2021-04-12 RX ORDER — HYDROCHLOROTHIAZIDE 12.5 MG/1
TABLET ORAL
Qty: 30 TABLET | Refills: 0 | OUTPATIENT
Start: 2021-04-12

## 2021-04-21 ENCOUNTER — HOSPITAL ENCOUNTER (OUTPATIENT)
Age: 46
Discharge: HOME | End: 2021-04-21
Payer: COMMERCIAL

## 2021-04-21 ENCOUNTER — APPOINTMENT (OUTPATIENT)
Dept: GENERAL RADIOLOGY | Age: 46
End: 2021-04-21
Attending: EMERGENCY MEDICINE
Payer: COMMERCIAL

## 2021-04-21 VITALS
TEMPERATURE: 98 F | HEART RATE: 76 BPM | BODY MASS INDEX: 25.67 KG/M2 | HEIGHT: 74 IN | WEIGHT: 200 LBS | DIASTOLIC BLOOD PRESSURE: 90 MMHG | SYSTOLIC BLOOD PRESSURE: 130 MMHG | RESPIRATION RATE: 18 BRPM | OXYGEN SATURATION: 100 %

## 2021-04-21 DIAGNOSIS — I10 ESSENTIAL HYPERTENSION: ICD-10-CM

## 2021-04-21 DIAGNOSIS — Z86.39 HISTORY OF HYPERLIPIDEMIA: ICD-10-CM

## 2021-04-21 DIAGNOSIS — M79.602 ARM PAIN, ANTERIOR, LEFT: Primary | ICD-10-CM

## 2021-04-21 PROCEDURE — 99203 OFFICE O/P NEW LOW 30 MIN: CPT | Performed by: EMERGENCY MEDICINE

## 2021-04-21 PROCEDURE — 93000 ELECTROCARDIOGRAM COMPLETE: CPT | Performed by: EMERGENCY MEDICINE

## 2021-04-21 PROCEDURE — 71046 X-RAY EXAM CHEST 2 VIEWS: CPT | Performed by: EMERGENCY MEDICINE

## 2021-04-21 PROCEDURE — 84484 ASSAY OF TROPONIN QUANT: CPT | Performed by: EMERGENCY MEDICINE

## 2021-04-21 PROCEDURE — 85025 COMPLETE CBC W/AUTO DIFF WBC: CPT | Performed by: EMERGENCY MEDICINE

## 2021-04-21 PROCEDURE — 81002 URINALYSIS NONAUTO W/O SCOPE: CPT | Performed by: EMERGENCY MEDICINE

## 2021-04-21 PROCEDURE — 80047 BASIC METABLC PNL IONIZED CA: CPT | Performed by: EMERGENCY MEDICINE

## 2021-04-21 NOTE — ED INITIAL ASSESSMENT (HPI)
Per pt having 2 days of left arm pain reports last night woke him up. Pt denies any injury, pain mostly upper inside of arm but radiates around arm. Pt states one week ago was heavy things. detailed exam

## 2021-04-21 NOTE — ED PROVIDER NOTES
Patient Seen in: [facility]      History   Patient presents with:  Arm Pain    Stated Complaint: PAIN LEFT ARM    HPI/Subjective:   Bianka Hyatt is a 48year old male here for left arm pain that started 2 days ago.   Medical history inclu Positive for stated complaint: PAIN LEFT ARM  Other systems are as noted in HPI. Constitutional and vital signs reviewed. All other systems reviewed and negative except as noted above.     Physical Exam     ED Triage Vitals [04/21/21 0820]   BP (!) 14 Ketone, Urine Trace (*)     All other components within normal limits   POCT ISTAT CHEM8 CARTRIDGE - Abnormal; Notable for the following components:    ISTAT Glucose 112 (*)     All other components within normal limits   ISTAT TROPONIN - Normal   POCT CB Range    ISTAT Troponin <0.02 <0.045 ng/mL ng/mL       Reviewed  EKG    Rate, intervals and axes as noted on EKG Report. Rate: 74  Rhythm: Sinus Rhythm  Reading:  WNL            Reviewed  XR CHEST PA + LAT CHEST (CPT=71046)    Result Date: 4/21/2021  CONCL trouble sleeping due to the pain. I suggested nonscheduled medications of melatonin 1 mg, or Benadryl 25 mg. If patient needs anything stronger he can contact his primary care physician. He is to rest, and ice his arm.   Avoid heat, or strenuous activity

## 2021-05-11 DIAGNOSIS — I10 HYPERTENSION WITH GOAL BLOOD PRESSURE LESS THAN 140/90: ICD-10-CM

## 2021-05-11 RX ORDER — METOPROLOL SUCCINATE 50 MG/1
75 TABLET, EXTENDED RELEASE ORAL DAILY
Qty: 90 TABLET | Refills: 0 | Status: SHIPPED | OUTPATIENT
Start: 2021-05-11 | End: 2021-07-08

## 2021-05-11 RX ORDER — HYDROCHLOROTHIAZIDE 12.5 MG/1
12.5 TABLET ORAL DAILY
Qty: 60 TABLET | Refills: 0 | Status: SHIPPED | OUTPATIENT
Start: 2021-05-11 | End: 2021-06-11

## 2021-05-11 NOTE — TELEPHONE ENCOUNTER
A refill request was received for:  Requested Prescriptions     Pending Prescriptions Disp Refills   • HYDROCHLOROTHIAZIDE 12.5 MG Oral Tab [Pharmacy Med Name: HYDROCHLOROTHIAZIDE 12.5 MG TB] 30 tablet 1     Sig: TAKE 1 TABLET BY MOUTH EVERY DAY   • SERTRA

## 2021-05-26 ENCOUNTER — OFFICE VISIT (OUTPATIENT)
Dept: FAMILY MEDICINE CLINIC | Facility: CLINIC | Age: 46
End: 2021-05-26
Payer: COMMERCIAL

## 2021-05-26 ENCOUNTER — LABORATORY ENCOUNTER (OUTPATIENT)
Dept: LAB | Facility: REFERENCE LAB | Age: 46
End: 2021-05-26
Attending: FAMILY MEDICINE
Payer: COMMERCIAL

## 2021-05-26 VITALS
HEART RATE: 76 BPM | DIASTOLIC BLOOD PRESSURE: 86 MMHG | HEIGHT: 74 IN | WEIGHT: 190 LBS | SYSTOLIC BLOOD PRESSURE: 128 MMHG | OXYGEN SATURATION: 98 % | BODY MASS INDEX: 24.38 KG/M2

## 2021-05-26 DIAGNOSIS — Z12.11 COLON CANCER SCREENING: ICD-10-CM

## 2021-05-26 DIAGNOSIS — Z00.00 ENCOUNTER FOR ROUTINE ADULT HEALTH EXAMINATION WITHOUT ABNORMAL FINDINGS: ICD-10-CM

## 2021-05-26 DIAGNOSIS — R74.8 ELEVATED LIVER ENZYMES: ICD-10-CM

## 2021-05-26 DIAGNOSIS — E78.2 MIXED HYPERLIPIDEMIA: ICD-10-CM

## 2021-05-26 DIAGNOSIS — Z00.00 ENCOUNTER FOR ROUTINE ADULT HEALTH EXAMINATION WITHOUT ABNORMAL FINDINGS: Primary | ICD-10-CM

## 2021-05-26 DIAGNOSIS — F41.9 ANXIETY: ICD-10-CM

## 2021-05-26 DIAGNOSIS — I10 HYPERTENSION WITH GOAL BLOOD PRESSURE LESS THAN 140/90: ICD-10-CM

## 2021-05-26 DIAGNOSIS — Z00.01 ENCOUNTER FOR ROUTINE ADULT HEALTH EXAMINATION WITH ABNORMAL FINDINGS: ICD-10-CM

## 2021-05-26 PROCEDURE — 81003 URINALYSIS AUTO W/O SCOPE: CPT

## 2021-05-26 PROCEDURE — 3074F SYST BP LT 130 MM HG: CPT | Performed by: FAMILY MEDICINE

## 2021-05-26 PROCEDURE — 80061 LIPID PANEL: CPT

## 2021-05-26 PROCEDURE — 80053 COMPREHEN METABOLIC PANEL: CPT

## 2021-05-26 PROCEDURE — 85025 COMPLETE CBC W/AUTO DIFF WBC: CPT

## 2021-05-26 PROCEDURE — 3079F DIAST BP 80-89 MM HG: CPT | Performed by: FAMILY MEDICINE

## 2021-05-26 PROCEDURE — 83036 HEMOGLOBIN GLYCOSYLATED A1C: CPT

## 2021-05-26 PROCEDURE — 99396 PREV VISIT EST AGE 40-64: CPT | Performed by: FAMILY MEDICINE

## 2021-05-26 PROCEDURE — 36415 COLL VENOUS BLD VENIPUNCTURE: CPT

## 2021-05-26 PROCEDURE — 3008F BODY MASS INDEX DOCD: CPT | Performed by: FAMILY MEDICINE

## 2021-05-26 PROCEDURE — 86803 HEPATITIS C AB TEST: CPT

## 2021-05-26 NOTE — PROGRESS NOTES
Tirso Stafford is a 48year old male who presents for a complete physical exam.   HPI:     Concerns: Needs to work on his alcohol intake as he has been drinking more and wants to be done with drinking.  Interested in seeing a therapist to help him to cut by mouth daily. 90 tablet 0   • ALPRAZolam 0.5 MG Oral Tab Take 1 tablet (0.5 mg total) by mouth daily as needed for Anxiety.  30 tablet 0   • atorvastatin 20 MG Oral Tab TAKE 1 TABLET BY MOUTH EVERY NIGHT (Patient not taking: Reported on 5/26/2021 ) 30 tab Primary Care Evaluation of Mental Disorders Patient Health Questionnaire (PRIME-MD PHQ). The PHQ was developed by Ashvin Taylor, Dorothy Najera and colleagues. For research information, contact Dr. Albert Stewart at [email or web address]. [E]      Hemoglobin A1C (Glycohemoglobin) [E]      CBC W Differential W Platelet [E]      Comp Metabolic Panel (14) [E]      Urinalysis, Routine [E][If pt <2 yrs old, must also order Reducing Substance ([id number])]    Referral to [facility] given to help ma

## 2021-05-26 NOTE — PATIENT INSTRUCTIONS
Prevention Guidelines, Men Ages 36 to 52  Screening tests and vaccines are an important part of managing your health. A screening test is done to find possible disorders or diseases in people who don't have any symptoms.  The goal is to find a disease ear talk with your healthcare provider At routine exams   Tuberculosis Men at increased risk for infection – talk with your healthcare provider Check with your healthcare provider   Vision All men in this age group Every 2 to 4 years if no risk factors for eye then at routine exams   Sexually transmitted infection prevention Men at increased risk for infection – talk with your healthcare provider At routine exams   Use of daily aspirin Men ages 39 to 78 at risk for cardiovascular health problems At routine exams cholesterol is high, follow the steps below to help lower your total cholesterol level:  Eat less unhealthy fat  · Cut back on saturated fats and trans fats (also called hydrogenated) by selecting lean cuts of meat, low-fat dairy, and using oils instead of to a safe level. Medicine to lower cholesterol levels is effective and safe. Taking medicine is not a substitute for exercise or watching your diet! Your healthcare provider can tell you whether you might benefit from a cholesterol-lowering medicine.   Stay

## 2021-06-11 RX ORDER — HYDROCHLOROTHIAZIDE 12.5 MG/1
12.5 TABLET ORAL DAILY
Qty: 90 TABLET | Refills: 1 | Status: SHIPPED | OUTPATIENT
Start: 2021-06-11 | End: 2021-10-12

## 2021-06-11 NOTE — TELEPHONE ENCOUNTER
A refill request was received for:  Requested Prescriptions     Pending Prescriptions Disp Refills   • HYDROCHLOROTHIAZIDE 12.5 MG Oral Tab [Pharmacy Med Name: HYDROCHLOROTHIAZIDE 12.5 MG TB] 30 tablet 1     Sig: TAKE 1 TABLET BY MOUTH EVERY DAY     Last r

## 2021-06-12 NOTE — TELEPHONE ENCOUNTER
A refill request was received for:  Requested Prescriptions     Pending Prescriptions Disp Refills   • SERTRALINE HCL 50 MG Oral Tab [Pharmacy Med Name: SERTRALINE HCL 50 MG TABLET] 30 tablet 1     Sig: TAKE 1 TABLET BY MOUTH EVERY DAY     Last refill date

## 2021-07-08 DIAGNOSIS — I10 HYPERTENSION WITH GOAL BLOOD PRESSURE LESS THAN 140/90: ICD-10-CM

## 2021-07-08 RX ORDER — METOPROLOL SUCCINATE 50 MG/1
75 TABLET, EXTENDED RELEASE ORAL DAILY
Qty: 135 TABLET | Refills: 1 | Status: SHIPPED | OUTPATIENT
Start: 2021-07-08 | End: 2021-10-12

## 2021-10-12 DIAGNOSIS — I10 HYPERTENSION WITH GOAL BLOOD PRESSURE LESS THAN 140/90: ICD-10-CM

## 2021-10-12 RX ORDER — ALPRAZOLAM 0.5 MG/1
0.5 TABLET ORAL DAILY PRN
Qty: 30 TABLET | Refills: 0 | Status: SHIPPED | OUTPATIENT
Start: 2021-10-12

## 2021-10-12 RX ORDER — HYDROCHLOROTHIAZIDE 12.5 MG/1
12.5 TABLET ORAL DAILY
Qty: 90 TABLET | Refills: 1 | Status: SHIPPED | OUTPATIENT
Start: 2021-10-12

## 2021-10-12 RX ORDER — METOPROLOL SUCCINATE 50 MG/1
75 TABLET, EXTENDED RELEASE ORAL DAILY
Qty: 135 TABLET | Refills: 1 | Status: SHIPPED | OUTPATIENT
Start: 2021-10-12

## 2021-10-12 NOTE — TELEPHONE ENCOUNTER
A refill request was received for:  Requested Prescriptions     Pending Prescriptions Disp Refills   • ALPRAZolam 0.5 MG Oral Tab 30 tablet 0     Sig: Take 1 tablet (0.5 mg total) by mouth daily as needed for Anxiety.    • hydrochlorothiazide 12.5 MG Oral T

## 2022-04-11 RX ORDER — METOPROLOL SUCCINATE 50 MG/1
75 TABLET, EXTENDED RELEASE ORAL DAILY
Qty: 135 TABLET | Refills: 0 | Status: SHIPPED | OUTPATIENT
Start: 2022-04-11

## 2022-04-11 NOTE — TELEPHONE ENCOUNTER
Please review. Protocol failed or has no protocol.     Requested Prescriptions   Pending Prescriptions Disp Refills    METOPROLOL SUCCINATE ER 50 MG Oral Tablet 24 Hr [Pharmacy Med Name: METOPROLOL SUCC ER 50 MG TAB] 135 tablet 1     Sig: TAKE 1 AND 1/2 TABLETS BY MOUTH DAILY        Hypertensive Medications Protocol Failed - 4/10/2022  7:24 AM        Failed - Appointment in past 6 or next 3 months        Passed - CMP or BMP in past 12 months        Passed - GFR Non- > 50     Lab Results   Component Value Date    GFRNAA 108 05/26/2021                       Recent Outpatient Visits              10 months ago Encounter for routine adult health examination without abnormal findings    [address] [place]    Office Visit    1 year ago Elevated liver enzymes    [address] [place] [address], [address] [place] [address]    2 years ago Encounter for routine adult health examination with abnormal findings    [address] [place]    Office Visit    3 years ago Encounter for routine adult health examination without abnormal findings    [address] [place]    Office Visit    3 years ago Hypertension with goal blood pressure less than 140/90    [facility], [address], [place], [place]    Office Visit

## 2022-05-30 RX ORDER — HYDROCHLOROTHIAZIDE 12.5 MG/1
12.5 TABLET ORAL DAILY
Qty: 90 TABLET | Refills: 0 | Status: SHIPPED | OUTPATIENT
Start: 2022-05-30

## 2022-05-30 NOTE — TELEPHONE ENCOUNTER
Please review; protocol failed/no protocol.      Requested Prescriptions   Pending Prescriptions Disp Refills    HYDROCHLOROTHIAZIDE 12.5 MG Oral Tab [Pharmacy Med Name: HYDROCHLOROTHIAZIDE 12.5 MG TB] 90 tablet 1     Sig: TAKE 1 TABLET BY MOUTH EVERY DAY        Hypertensive Medications Protocol Failed - 5/30/2022  9:16 AM        Failed - CMP or BMP in past 12 months        Passed - Appointment in past 6 or next 3 months        Passed - GFR Non- > 50     Lab Results   Component Value Date    GFRNAA 108 05/26/2021                        Recent Outpatient Visits              1 year ago Encounter for routine adult health examination without abnormal findings    [address], [place]    Office Visit    1 year ago Elevated liver enzymes    [address] [facility], [address] [facility], [address] [place] [address]    2 years ago Encounter for routine adult health examination with abnormal findings    [address], [place]    Office Visit    3 years ago Encounter for routine adult health examination without abnormal findings    [address], [place]    Office Visit    4 years ago Hypertension with goal blood pressure less than 140/90    [employer], [address], [place], [facility] Visit             Future Appointments         Provider Department Appt Notes    In 2 days Cayetano Wilkes, [address] [facility], [address]

## 2022-06-01 ENCOUNTER — OFFICE VISIT (OUTPATIENT)
Dept: FAMILY MEDICINE CLINIC | Facility: CLINIC | Age: 47
End: 2022-06-01
Payer: COMMERCIAL

## 2022-06-01 ENCOUNTER — LAB ENCOUNTER (OUTPATIENT)
Dept: LAB | Facility: REFERENCE LAB | Age: 47
End: 2022-06-01
Attending: FAMILY MEDICINE
Payer: COMMERCIAL

## 2022-06-01 VITALS
BODY MASS INDEX: 24.77 KG/M2 | WEIGHT: 193 LBS | HEIGHT: 74 IN | DIASTOLIC BLOOD PRESSURE: 84 MMHG | HEART RATE: 73 BPM | SYSTOLIC BLOOD PRESSURE: 124 MMHG | OXYGEN SATURATION: 97 %

## 2022-06-01 DIAGNOSIS — Z12.5 PROSTATE CANCER SCREENING: ICD-10-CM

## 2022-06-01 DIAGNOSIS — I10 HYPERTENSION WITH GOAL BLOOD PRESSURE LESS THAN 140/90: ICD-10-CM

## 2022-06-01 DIAGNOSIS — M19.071 ARTHRITIS OF RIGHT FOOT: ICD-10-CM

## 2022-06-01 DIAGNOSIS — F41.9 ANXIETY: ICD-10-CM

## 2022-06-01 DIAGNOSIS — F10.10 ALCOHOL ABUSE: ICD-10-CM

## 2022-06-01 DIAGNOSIS — E78.2 MIXED HYPERLIPIDEMIA: ICD-10-CM

## 2022-06-01 DIAGNOSIS — R74.8 ELEVATED LIVER ENZYMES: ICD-10-CM

## 2022-06-01 DIAGNOSIS — Z00.00 ENCOUNTER FOR ROUTINE ADULT HEALTH EXAMINATION WITHOUT ABNORMAL FINDINGS: Primary | ICD-10-CM

## 2022-06-01 DIAGNOSIS — Z12.11 COLON CANCER SCREENING: ICD-10-CM

## 2022-06-01 DIAGNOSIS — M77.12 LATERAL EPICONDYLITIS OF LEFT ELBOW: ICD-10-CM

## 2022-06-01 DIAGNOSIS — Z00.00 ENCOUNTER FOR ROUTINE ADULT HEALTH EXAMINATION WITHOUT ABNORMAL FINDINGS: ICD-10-CM

## 2022-06-01 LAB
ALBUMIN SERPL-MCNC: 4.1 G/DL (ref 3.4–5)
ALBUMIN/GLOB SERPL: 1.1 {RATIO} (ref 1–2)
ALP LIVER SERPL-CCNC: 95 U/L
ALT SERPL-CCNC: 123 U/L
ANION GAP SERPL CALC-SCNC: 8 MMOL/L (ref 0–18)
AST SERPL-CCNC: 70 U/L (ref 15–37)
BASOPHILS # BLD AUTO: 0.06 X10(3) UL (ref 0–0.2)
BASOPHILS NFR BLD AUTO: 1.4 %
BILIRUB SERPL-MCNC: 0.6 MG/DL (ref 0.1–2)
BUN BLD-MCNC: 11 MG/DL (ref 7–18)
BUN/CREAT SERPL: 12.5 (ref 10–20)
CALCIUM BLD-MCNC: 9.5 MG/DL (ref 8.5–10.1)
CHLORIDE SERPL-SCNC: 103 MMOL/L (ref 98–112)
CHOLEST SERPL-MCNC: 275 MG/DL (ref ?–200)
CO2 SERPL-SCNC: 27 MMOL/L (ref 21–32)
COMPLEXED PSA SERPL-MCNC: 1.51 NG/ML (ref ?–4)
CREAT BLD-MCNC: 0.88 MG/DL
CREAT UR-SCNC: 122 MG/DL
DEPRECATED RDW RBC AUTO: 42.6 FL (ref 35.1–46.3)
EOSINOPHIL # BLD AUTO: 0.18 X10(3) UL (ref 0–0.7)
EOSINOPHIL NFR BLD AUTO: 4.3 %
ERYTHROCYTE [DISTWIDTH] IN BLOOD BY AUTOMATED COUNT: 11.8 % (ref 11–15)
EST. AVERAGE GLUCOSE BLD GHB EST-MCNC: 97 MG/DL (ref 68–126)
FASTING PATIENT LIPID ANSWER: YES
FASTING STATUS PATIENT QL REPORTED: YES
GLOBULIN PLAS-MCNC: 3.8 G/DL (ref 2.8–4.4)
GLUCOSE BLD-MCNC: 82 MG/DL (ref 70–99)
HBA1C MFR BLD: 5 % (ref ?–5.7)
HCT VFR BLD AUTO: 44.7 %
HDLC SERPL-MCNC: 98 MG/DL (ref 40–59)
HGB BLD-MCNC: 14.8 G/DL
IMM GRANULOCYTES # BLD AUTO: 0.01 X10(3) UL (ref 0–1)
IMM GRANULOCYTES NFR BLD: 0.2 %
LDLC SERPL CALC-MCNC: 158 MG/DL (ref ?–100)
LYMPHOCYTES # BLD AUTO: 1.58 X10(3) UL (ref 1–4)
LYMPHOCYTES NFR BLD AUTO: 37.8 %
MCH RBC QN AUTO: 32.6 PG (ref 26–34)
MCHC RBC AUTO-ENTMCNC: 33.1 G/DL (ref 31–37)
MCV RBC AUTO: 98.5 FL
MICROALBUMIN UR-MCNC: 1.46 MG/DL
MICROALBUMIN/CREAT 24H UR-RTO: 12 UG/MG (ref ?–30)
MONOCYTES # BLD AUTO: 0.53 X10(3) UL (ref 0.1–1)
MONOCYTES NFR BLD AUTO: 12.7 %
NEUTROPHILS # BLD AUTO: 1.82 X10 (3) UL (ref 1.5–7.7)
NEUTROPHILS # BLD AUTO: 1.82 X10(3) UL (ref 1.5–7.7)
NEUTROPHILS NFR BLD AUTO: 43.6 %
NONHDLC SERPL-MCNC: 177 MG/DL (ref ?–130)
OSMOLALITY SERPL CALC.SUM OF ELEC: 284 MOSM/KG (ref 275–295)
PLATELET # BLD AUTO: 239 10(3)UL (ref 150–450)
POTASSIUM SERPL-SCNC: 3.7 MMOL/L (ref 3.5–5.1)
PROT SERPL-MCNC: 7.9 G/DL (ref 6.4–8.2)
RBC # BLD AUTO: 4.54 X10(6)UL
SODIUM SERPL-SCNC: 138 MMOL/L (ref 136–145)
TRIGL SERPL-MCNC: 111 MG/DL (ref 30–149)
VLDLC SERPL CALC-MCNC: 21 MG/DL (ref 0–30)
WBC # BLD AUTO: 4.2 X10(3) UL (ref 4–11)

## 2022-06-01 PROCEDURE — 3008F BODY MASS INDEX DOCD: CPT | Performed by: FAMILY MEDICINE

## 2022-06-01 PROCEDURE — 99396 PREV VISIT EST AGE 40-64: CPT | Performed by: FAMILY MEDICINE

## 2022-06-01 PROCEDURE — 3074F SYST BP LT 130 MM HG: CPT | Performed by: FAMILY MEDICINE

## 2022-06-01 PROCEDURE — 82570 ASSAY OF URINE CREATININE: CPT

## 2022-06-01 PROCEDURE — 99214 OFFICE O/P EST MOD 30 MIN: CPT | Performed by: FAMILY MEDICINE

## 2022-06-01 PROCEDURE — 85025 COMPLETE CBC W/AUTO DIFF WBC: CPT

## 2022-06-01 PROCEDURE — 3079F DIAST BP 80-89 MM HG: CPT | Performed by: FAMILY MEDICINE

## 2022-06-01 PROCEDURE — 36415 COLL VENOUS BLD VENIPUNCTURE: CPT

## 2022-06-01 PROCEDURE — 83036 HEMOGLOBIN GLYCOSYLATED A1C: CPT

## 2022-06-01 PROCEDURE — 80053 COMPREHEN METABOLIC PANEL: CPT

## 2022-06-01 PROCEDURE — 80061 LIPID PANEL: CPT

## 2022-06-01 PROCEDURE — 82043 UR ALBUMIN QUANTITATIVE: CPT

## 2022-06-01 RX ORDER — ALPRAZOLAM 0.5 MG/1
0.5 TABLET ORAL DAILY PRN
Qty: 30 TABLET | Refills: 0 | Status: SHIPPED | OUTPATIENT
Start: 2022-06-01

## 2022-06-02 DIAGNOSIS — R74.8 ELEVATED LIVER ENZYMES: Primary | ICD-10-CM

## 2022-07-13 DIAGNOSIS — I10 HYPERTENSION WITH GOAL BLOOD PRESSURE LESS THAN 140/90: ICD-10-CM

## 2022-07-13 RX ORDER — METOPROLOL SUCCINATE 50 MG/1
75 TABLET, EXTENDED RELEASE ORAL DAILY
Qty: 135 TABLET | Refills: 1 | Status: SHIPPED | OUTPATIENT
Start: 2022-07-13

## 2022-08-29 RX ORDER — HYDROCHLOROTHIAZIDE 12.5 MG/1
12.5 TABLET ORAL DAILY
Qty: 90 TABLET | Refills: 1 | Status: SHIPPED | OUTPATIENT
Start: 2022-08-29

## 2022-08-29 NOTE — TELEPHONE ENCOUNTER
Refill passed per [facility]u protocol.     .  Requested Prescriptions   Pending Prescriptions Disp Refills    HYDROCHLOROTHIAZIDE 12.5 MG Oral Tab [Pharmacy Med Name: HYDROCHLOROTHIAZIDE 12.5 MG TB] 90 tablet 0     Sig: TAKE 1 TABLET BY MOUTH EVERY DAY        Hypertensive Medications Protocol Passed - 8/28/2022  1:16 PM        Passed - In person appointment in the past 12 or next 3 months       Recent Outpatient Visits              2 months ago Encounter for routine adult health examination without abnormal findings    [address], [place]    Office Visit    1 year ago Encounter for routine adult health examination without abnormal findings    [address], [facility] [address] Visit    2 years ago Elevated liver enzymes    [address] [facility], [address] [place] [address]    2 years ago Encounter for routine adult health examination with abnormal findings    [address], [address] Visit    3 years ago Encounter for routine adult health examination without abnormal findings    [address], [address] [place], [place]    Office Visit                 Passed - Last BP reading less than 140/90     BP Readings from Last 1 Encounters:  06/01/22 : 124/84                Passed - CMP or BMP in past 6 months     Recent Results (from the past 4392 hour(s))   COMP METABOLIC PANEL (14)    Collection Time: 06/01/22  8:46 AM   Result Value Ref Range    Glucose 82 70 - 99 mg/dL    Sodium 138 136 - 145 mmol/L    Potassium 3.7 3.5 - 5.1 mmol/L    Chloride 103 98 - 112 mmol/L    CO2 27.0 21.0 - 32.0 mmol/L    Anion Gap 8 0 - 18 mmol/L    BUN 11 7 - 18 mg/dL    Creatinine 0.88 0.70 - 1.30 mg/dL    BUN/CREA Ratio 12.5 10.0 - 20.0    Calcium, Total 9.5 8.5 - 10.1 mg/dL    Calculated Osmolality 284 275 - 295 mOsm/kg    GFR, Non- 101 >=60    GFR, -American 117 >=60     (H) 16 - 61 U/L    AST 70 (H) 15 - 37 U/L    Alkaline Phosphatase 95 45 - 117 U/L    Bilirubin, Total 0.6 0.1 - 2.0 mg/dL    Total Protein 7.9 6.4 - 8.2 g/dL    Albumin 4.1 3.4 - 5.0 g/dL    Globulin  3.8 2.8 - 4.4 g/dL    A/G Ratio 1.1 1.0 - 2.0    Patient Fasting for CMP? Yes      *Note: Due to a large number of results and/or encounters for the requested time period, some results have not been displayed. A complete set of results can be found in Results Review.                  Passed - In person appointment or virtual visit in the past 6 months       Recent Outpatient Visits              2 months ago Encounter for routine adult health examination without abnormal findings    [facility]    [facility]    1 year ago Encounter for routine adult health examination without abnormal findings    [facility]    [facility] Visit    2 years ago Elevated liver enzymes    [facility], 1[facility], [address] [place] [address]    2 years ago Encounter for routine adult health examination with abnormal findings    [address], [facility] Visit    3 years ago Encounter for routine adult health examination without abnormal findings    [facility]    [facility] Visit                 Passed - GFR > 50     No results found for: [facility]                    Recent Outpatient Visits              2 months ago Encounter for routine adult health examination without abnormal findings    [facility]    [facility] Visit    1 year ago Encounter for routine adult health examination without abnormal findings    [facility], [place] , [place]    Office Visit    2 years ago Elevated liver enzymes    [facility], [address] [place] [address]    Telemedicine    2 years ago Encounter for routine adult health examination with abnormal findings    [address], [place]    Office Visit    3 years ago Encounter for routine adult health examination without abnormal findings    [facility], [place], [place]    Office Visit

## 2023-01-07 DIAGNOSIS — I10 HYPERTENSION WITH GOAL BLOOD PRESSURE LESS THAN 140/90: ICD-10-CM

## 2023-01-09 RX ORDER — METOPROLOL SUCCINATE 50 MG/1
TABLET, EXTENDED RELEASE ORAL
Qty: 135 TABLET | Refills: 1 | Status: SHIPPED | OUTPATIENT
Start: 2023-01-09

## 2023-02-28 RX ORDER — HYDROCHLOROTHIAZIDE 12.5 MG/1
12.5 TABLET ORAL DAILY
Qty: 90 TABLET | Refills: 0 | Status: SHIPPED | OUTPATIENT
Start: 2023-02-28

## 2023-02-28 NOTE — TELEPHONE ENCOUNTER
Please review. Protocol Failed or has No Protocol. Requested Prescriptions   Pending Prescriptions Disp Refills    HYDROCHLOROTHIAZIDE 12.5 MG Oral Tab [Pharmacy Med Name: HYDROCHLOROTHIAZIDE 12.5 MG TB] 90 tablet 1     Sig: TAKE 1 TABLET BY MOUTH EVERY DAY       Hypertensive Medications Protocol Failed - 2/25/2023  8:52 AM        Failed - CMP or BMP in past 6 months     No results found for this or any previous visit (from the past 4392 hour(s)).             Failed - In person appointment or virtual visit in the past 6 months     Recent Outpatient Visits              9 months ago Encounter for routine adult health examination without abnormal findings    [address], [place]    Office Visit    1 year ago Encounter for routine adult health examination without abnormal findings    [facility], 1199 [facility], 1013 [facility] Visit    2 years ago Elevated liver enzymes    [facility], [address], 1199 [facility], [id number] [place] [address]    3 years ago Encounter for routine adult health examination with abnormal findings    [facility], 1199 [facility], [address] [place] Visit    4 years ago Encounter for routine adult health examination without abnormal findings    Man[address]inder Blanc [address] - In person appointment in the past 12 or next 3 months     Recent Outpatient Visits              9 months ago Encounter for routine adult health examination without abnormal findings    [address], [place]    Office Visit    1 year ago Encounter for routine adult health examination without abnormal findings    [address], [place]    Office Visit    2 years ago Elevated liver enzymes    [facility], [address], [address] [place], [place]    Telemedicine    3 years ago Encounter for routine adult health examination with abnormal findings    [facility] [address] [place] Visit    4 years ago Encounter for routine adult health examination without abnormal findings    [facility], [place]    Office Visit                      Passed - Last BP reading less than 140/90     BP Readings from Last 1 Encounters:  06/01/22 : 124/84              Passed - EGFRCR or GFRNAA > 50     GFR Evaluation  GFRNAA: 101 , resulted on 6/1/2022                   Recent Outpatient Visits              9 months ago Encounter for routine adult health examination without abnormal findings    [facility], [place]    Office Visit    1 year ago Encounter for routine adult health examination without abnormal findings    [facility], [facility] [place], [place]    Office Visit    2 years ago Elevated liver enzymes    [facility], [address], 1199 [facility], [address] [place] [address]    3 years ago Encounter for routine adult health examination with abnormal findings    [facility], 1199 [facility], [address] [place] Visit    4 years ago Encounter for routine adult health examination without abnormal findings    [facility], [facility] [place], [place]    Office Visit

## 2023-03-18 NOTE — TELEPHONE ENCOUNTER
Please review. Protocol Failed or has no Protocol    Requested Prescriptions   Pending Prescriptions Disp Refills    ALPRAZolam 0.5 MG Oral Tab 30 tablet 0     Sig: Take 1 tablet (0.5 mg total) by mouth daily as needed for Anxiety.        There is no refill protocol information for this order

## 2023-03-19 RX ORDER — ALPRAZOLAM 0.5 MG/1
0.5 TABLET ORAL DAILY PRN
Qty: 30 TABLET | Refills: 0 | Status: SHIPPED | OUTPATIENT
Start: 2023-03-19

## 2023-04-24 ENCOUNTER — TELEMEDICINE (OUTPATIENT)
Facility: CLINIC | Age: 48
End: 2023-04-24
Payer: COMMERCIAL

## 2023-04-24 VITALS — BODY MASS INDEX: 25 KG/M2 | HEIGHT: 74 IN

## 2023-04-24 DIAGNOSIS — L30.9 ECZEMA, UNSPECIFIED TYPE: Primary | ICD-10-CM

## 2023-04-24 DIAGNOSIS — R74.8 ELEVATED LIVER ENZYMES: ICD-10-CM

## 2023-04-24 DIAGNOSIS — F41.9 ANXIETY: ICD-10-CM

## 2023-04-24 DIAGNOSIS — L21.9 SEBORRHEIC DERMATITIS: ICD-10-CM

## 2023-04-24 RX ORDER — ALCLOMETASONE DIPROPIONATE 0.5 MG/G
1 OINTMENT TOPICAL 2 TIMES DAILY
Qty: 60 G | Refills: 0 | Status: SHIPPED | OUTPATIENT
Start: 2023-04-24

## 2023-04-24 RX ORDER — KETOCONAZOLE 20 MG/ML
SHAMPOO TOPICAL
Qty: 120 ML | Refills: 0 | Status: SHIPPED | OUTPATIENT
Start: 2023-04-24 | End: 2023-07-23

## 2023-05-08 LAB — AMB EXT COVID-19 RESULT: DETECTED

## 2023-05-09 ENCOUNTER — PATIENT MESSAGE (OUTPATIENT)
Facility: CLINIC | Age: 48
End: 2023-05-09

## 2023-05-10 NOTE — TELEPHONE ENCOUNTER
From: Samara Coleman  To: Elle Leyva DO  Sent: 5/9/2023 4:11 PM CDT  Subject: Covid    Hi - two updates. Been about two weeks since I started the skin med, and my arms/shoulders, etc are great. Scalp - not so much. Little better, but not much movement. Not sure what to do? Also, was traveling for work last week and started feeling really bad Thursday. Friday morning woke up with fever, aches, chills, etc. Loaded up on sudafed and ibuprofen. Finally tested Monday and turned up positive for Covid. On the mend, just thought you should know.      thanks

## 2023-05-12 NOTE — TELEPHONE ENCOUNTER
Dr. Luzmaria Soni, please advise. Medication on backorder.      Alclometasone Dipropionate 0.05 % External Ointment

## 2023-05-22 RX ORDER — KETOCONAZOLE 20 MG/ML
SHAMPOO TOPICAL
Qty: 120 ML | Refills: 0 | Status: SHIPPED | OUTPATIENT
Start: 2023-05-22 | End: 2023-08-20

## 2023-05-22 NOTE — TELEPHONE ENCOUNTER
Please review. Protocol failed / Has no protocol. Requested Prescriptions   Pending Prescriptions Disp Refills    KETOCONAZOLE 2 % External Shampoo [Pharmacy Med Name: KETOCONAZOLE 2% SHAMPOO] 120 mL 0     Sig: Apply 1 mL topically daily for 14 days, THEN 1 mL twice a week.        There is no refill protocol information for this order        Future Appointments         Provider Department Appt Notes    In 1 month Stanislav Loco DO [address], [address], [facility] general health           Recent Outpatient Visits              4 weeks ago Eczema, unspecified type    [address] [facility], [address] [place], [place]    Telemedicine    11 months ago Encounter for routine adult health examination without abnormal findings    Man[address] Carey [address], [place]    Office Visit    1 year ago Encounter for routine adult health examination without abnormal findings    [address] [facility], [address] [place], [place]    Office Visit    2 years ago Elevated liver enzymes    [facility], [address], [address] [facility], [address] [place] [address]    3 years ago Encounter for routine adult health examination with abnormal findings    [address], [address], [address] [place], [place]    Office Visit

## 2023-06-02 RX ORDER — HYDROCHLOROTHIAZIDE 12.5 MG/1
TABLET ORAL
Qty: 90 TABLET | Refills: 0 | Status: SHIPPED | OUTPATIENT
Start: 2023-06-02

## 2023-06-02 NOTE — TELEPHONE ENCOUNTER
Please review. Protocol failed     Future Appointments   Date Time Provider Aryan Rivero   6/27/2023  8:30 AM Kerry Sun DO [facility] 14 FP [facility] 10 OP          Requested Prescriptions   Pending Prescriptions Disp Refills    HYDROCHLOROTHIAZIDE 12.5 MG Oral Tab [Pharmacy Med Name: HYDROCHLOROTHIAZIDE 12.5 MG TB] 90 tablet 0     Sig: TAKE 1 TABLET BY MOUTH EVERY DAY       Hypertensive Medications Protocol Failed - 6/2/2023  1:32 PM        Failed - CMP or BMP in past 6 months     No results found for this or any previous visit (from the past 4392 hour(s)).               Failed - EGFRCR or GFRNAA > 50     GFR Evaluation              Passed - In person appointment in the past 12 or next 3 months     Recent Outpatient Visits              1 month ago Eczema, unspecified type    Myles Anderson, [facility], [place]    Telemedicine    1 year ago Encounter for routine adult health examination without abnormal findings    [address], [address] [place] Visit    2 years ago Encounter for routine adult health examination without abnormal findings    [address], [address] [place] Visit    2 years ago Elevated liver enzymes    [facility], [address], [place], [address] [facility], [id number] [place] [address]    3 years ago Encounter for routine adult health examination with abnormal findings    [address], [address] [place] Visit          Future Appointments         Provider Department Appt Notes    In 3 weeks DO Myles Valdez, [address] [facility] - Last BP reading less than 140/90     BP Readings from Last 1 Encounters:  06/01/22 : 124/84                Passed - In person appointment or virtual visit in the past 6 months     Recent Outpatient Visits              1 month ago Eczema, unspecified type    [address], [address], [place]    Telemedicine    1 year ago Encounter for routine adult health examination without abnormal findings    Manjinder [address], [place]    Office Visit    2 years ago Encounter for routine adult health examination without abnormal findings    [address], [address], [address] [place] [place] Visit    2 years ago Elevated liver enzymes    [facility], [address], [address], [address]    3 years ago Encounter for routine adult health examination with abnormal findings    [address], [address], [place]    Office Visit          Future Appointments         Provider Department Appt Notes    In 3 weeks Anny Rm DO Roselie Lav, [address], [address]                  Future Appointments         Provider Department Appt Notes    In 3 weeks Anny Rm DO Roselie Lav, [address], [address] [facility] Visits              1 month ago Eczema, unspecified type    [address], [address], [address]    1 year ago Encounter for routine adult health examination without abnormal findings    [address], [address], [place]    Office Visit    2 years ago Encounter for routine adult health examination without abnormal findings    [address], [address], [address] [place] Visit    2 years ago Elevated liver enzymes    [facility], [address], [address], [address] [facility]    3 years ago Encounter for routine adult health examination with abnormal findings    [place] [facility], [address], [address], [place]    Office Visit

## 2023-07-20 DIAGNOSIS — I10 HYPERTENSION WITH GOAL BLOOD PRESSURE LESS THAN 140/90: ICD-10-CM

## 2023-07-20 RX ORDER — METOPROLOL SUCCINATE 50 MG/1
75 TABLET, EXTENDED RELEASE ORAL DAILY
Qty: 135 TABLET | Refills: 0 | Status: SHIPPED | OUTPATIENT
Start: 2023-07-20

## 2023-07-20 NOTE — TELEPHONE ENCOUNTER
Please review. Protocol failed / Has no protocol. Last visit was Telemedicine visit on 4/24/23. Requested Prescriptions   Pending Prescriptions Disp Refills    METOPROLOL SUCCINATE ER 50 MG Oral Tablet 24 Hr [Pharmacy Med Name: METOPROLOL SUCC ER 50 MG TAB] 135 tablet 1     Sig: TAKE 1 AND 1/2 TABLETS DAILY BY MOUTH       Hypertensive Medications Protocol Failed - 7/20/2023  1:16 AM        Failed - In person appointment in the past 12 or next 3 months     Recent Outpatient Visits              2 months ago Eczema, unspecified type    [address] [place] [address], [address] [place] [address]    Telemedicine    1 year ago Encounter for routine adult health examination without abnormal findings    [address] [place] [address]    Office Visit    2 years ago Encounter for routine adult health examination without abnormal findings    [address] [place] [address], [address] [place] [address], [address] [place] [address] Visit    3 years ago Elevated liver enzymes    [facility], [address], [address] [place] [address]    3 years ago Encounter for routine adult health examination with abnormal findings    [address], [place]    Office Visit                      Failed - CMP or BMP in past 6 months     No results found for this or any previous visit (from the past 4392 hour(s)).             Failed - EGFRCR or GFRNAA > 50     GFR Evaluation            Passed - Last BP reading less than 140/90     BP Readings from Last 1 Encounters:  06/01/22 : 124/84              Passed - In person appointment or virtual visit in the past 6 months     Recent Outpatient Visits              2 months ago Eczema, unspecified type    [address] [place] [address]    Telemedicine    1 year ago Encounter for routine adult health examination without abnormal findings Manjinder Wood De [facility], [place]    Office Visit    2 years ago Encounter for routine adult health examination without abnormal findings    [address], [address] [place] Visit    3 years ago Elevated liver enzymes    [facility], [address], [address] [place], [place]    Telemedicine    3 years ago Encounter for routine adult health examination with abnormal findings    [address] [place], [address] [place], [place]    Office Visit                            Recent Outpatient Visits              2 months ago Eczema, unspecified type    [address] [place], [address] [place], [place]    Telemedicine    1 year ago Encounter for routine adult health examination without abnormal findings    [address], [address], [address] [place], [place]    Office Visit    2 years ago Encounter for routine adult health examination without abnormal findings    [place] [place], [address], [address] [place] Visit    3 years ago Elevated liver enzymes    [facility], [address], [address], [address] [place] [address]    3 years ago Encounter for routine adult health examination with abnormal findings    [address], [address], [address] [place], [place]    Office Visit

## 2023-08-31 RX ORDER — HYDROCHLOROTHIAZIDE 12.5 MG/1
12.5 TABLET ORAL DAILY
Qty: 90 TABLET | Refills: 0 | Status: SHIPPED | OUTPATIENT
Start: 2023-08-31

## 2023-10-04 ENCOUNTER — PATIENT MESSAGE (OUTPATIENT)
Facility: CLINIC | Age: 48
End: 2023-10-04

## 2023-10-04 DIAGNOSIS — E78.2 MIXED HYPERLIPIDEMIA: Primary | ICD-10-CM

## 2023-10-04 DIAGNOSIS — R74.8 ELEVATED LIVER ENZYMES: ICD-10-CM

## 2023-10-05 NOTE — TELEPHONE ENCOUNTER
Dr. Teresa Banda    Please see patient 's recent MyChart messages below and result note from 6/1/22  Also noted GI referral was given on 4/6  but no OV noted    Future Appointments   Date Time Provider Aryan Rivero   1/9/2024  1:00 PM Joe Arias, DO [facility] 14 FP [facility] 10 OP         Comp Metabolic Panel (14) [E]: Patient Communication     Edit Comments   Add Notifications     Hi Riky,     Your LDL and total cholesterol levels are still quite high, though slightly better than last year. Your liver enzymes are also elevated, but they have improved. You would likely benefit from going back on a statin to lower your cholesterol levels and hopefully improve your liver enzymes, but I also want you to schedule the liver ultrasound as soon as possible. You can call [phone] to schedule. We can then talk about next steps with your medication. I also forgot to mention that there are medications that can help decrease the desire to drink alcohol, so we can talk about those options as well. Talk to you soon. Dr. Robert Wright Em Triage Support (supporting Sha Walker DO)23 hours ago (5:09 PM)     [facility]  Also, could someone give me the contact information for the liver scan and the colorectal screening again? Asia PERES Em Triage Support (supporting Sha Walker DO)23 hours ago (5:05 PM)     Becky Banda -   Just scheduled my physical - hadnt realized the scheduling had gotten that bad. I'm out in January, so will probably need a meds refill before then (nothing now). I also have a medical question about alcohol use/abuse where I can use some input. Let me know the best way to get about it?

## 2023-10-18 DIAGNOSIS — I10 HYPERTENSION WITH GOAL BLOOD PRESSURE LESS THAN 140/90: ICD-10-CM

## 2023-10-19 RX ORDER — METOPROLOL SUCCINATE 50 MG/1
75 TABLET, EXTENDED RELEASE ORAL DAILY
Qty: 135 TABLET | Refills: 0 | Status: SHIPPED | OUTPATIENT
Start: 2023-10-19

## 2023-10-19 NOTE — TELEPHONE ENCOUNTER
Please review; protocol failed. No active /future labs noted   Please see message below for upcoming appointment. Future Appointments   Date Time Provider Aryan Rivero   1/9/2024  1:00 PM Anny mR DO [facility] 14 FP [facility] 10 OP       Requested Prescriptions   Pending Prescriptions Disp Refills    METOPROLOL SUCCINATE ER 50 MG Oral Tablet 24 Hr [Pharmacy Med Name: METOPROLOL SUCC ER 50 MG TAB] 135 tablet 0     Sig: TAKE 1 AND 1/2 TABLETS BY MOUTH DAILY       Hypertensive Medications Protocol Failed - 10/18/2023 12:34 PM        Failed - CMP or BMP in past 6 months     No results found for this or any previous visit (from the past 4392 hour(s)).             Failed - EGFRCR or GFRNAA > 50     GFR Evaluation            Passed - In person appointment in the past 12 or next 3 months     Recent Outpatient Visits              5 months ago Eczema, unspecified type    [address], [address] [place], [place]    Telemedicine    1 year ago Encounter for routine adult health examination without abnormal findings    [address], [address] [place], [place]    Office Visit    2 years ago Encounter for routine adult health examination without abnormal findings    [address], [address], [address] [place] Visit    3 years ago Elevated liver enzymes    [facility], [facility], [facility], [address] [place] [address]    3 years ago Encounter for routine adult health examination with abnormal findings    [address], [address], [address] [place] Visit          Future Appointments         Provider Department Appt Notes    In 2 months Anny Rm DO [address], [address] - Last BP reading less than 140/90     BP Readings from Last 1 Encounters:  06/01/22 : 124/84              Passed - In person appointment or virtual visit in the past 6 months     Recent Outpatient Visits              5 months ago Eczema, unspecified type    [facility], [address], [place]    Telemedicine    1 year ago Encounter for routine adult health examination without abnormal findings    Manjinder [address], [place]    Office Visit    2 years ago Encounter for routine adult health examination without abnormal findings    [facility], [address], [address] [place] Visit    3 years ago Elevated liver enzymes    [facility], [address], [address], [address]    3 years ago Encounter for routine adult health examination with abnormal findings    [facility] [facility], [address], [place]    Office Visit          Future Appointments         Provider Department Appt Notes    In 2 months Hoa Eugene DO [address], [address], [place] Visits              5 months ago Eczema, unspecified type    [facility], [address], [address]    1 year ago Encounter for routine adult health examination without abnormal findings    [address], [address], [address], [address] [place] Visit    2 years ago Encounter for routine adult health examination without abnormal findings    [facility], [address], [address] [place] Visit    3 years ago Elevated liver enzymes    [facility], [address], [address], [address]    3 years ago Encounter for routine adult health examination with abnormal findings    [address], [address], [address], [address] [place] Visit          Future Appointments         Provider Department Appt Notes    In 2 months Rosa Moerira  [facility]

## 2023-10-22 RX ORDER — ALPRAZOLAM 0.5 MG/1
0.5 TABLET ORAL DAILY PRN
Qty: 30 TABLET | Refills: 0 | Status: SHIPPED | OUTPATIENT
Start: 2023-10-22

## 2023-10-24 ENCOUNTER — HOSPITAL ENCOUNTER (OUTPATIENT)
Age: 48
Discharge: HOME | End: 2023-10-24

## 2023-10-24 PROCEDURE — 90686 IIV4 VACC NO PRSV 0.5 ML IM: CPT | Performed by: EMERGENCY MEDICINE

## 2023-10-24 PROCEDURE — 90471 IMMUNIZATION ADMIN: CPT | Performed by: EMERGENCY MEDICINE

## 2023-11-30 RX ORDER — HYDROCHLOROTHIAZIDE 12.5 MG/1
12.5 TABLET ORAL DAILY
Qty: 90 TABLET | Refills: 0 | Status: SHIPPED | OUTPATIENT
Start: 2023-11-30

## 2023-11-30 NOTE — TELEPHONE ENCOUNTER
Please review; protocol failed. No active /future labs noted   Please see message below for upcoming appointment. Future Appointments   Date Time Provider Aryan Rivero   1/9/2024  1:00 PM Refugio Alonzo DO [facility] FP [facility] OP       Requested Prescriptions   Pending Prescriptions Disp Refills    HYDROCHLOROTHIAZIDE 12.5 MG Oral Tab [Pharmacy Med Name: HYDROCHLOROTHIAZIDE 12.5 MG TB] 90 tablet 0     Sig: TAKE 1 TABLET BY MOUTH EVERY DAY       Hypertensive Medications Protocol Failed - 11/28/2023  1:31 PM        Failed - CMP or BMP in past 6 months     No results found for this or any previous visit (from the past 4392 hour(s)).             Failed - In person appointment or virtual visit in the past 6 months     Recent Outpatient Visits              7 months ago Eczema, unspecified type    Lizbeth Hartmann [facility], [address] [facility]    1 year ago Encounter for routine adult health examination without abnormal findings    Arenas Dunker, [address], [place]    Office Visit    2 years ago Encounter for routine adult health examination without abnormal findings    i[address]zbeth [address], [facility] [address] [place] Visit    3 years ago Elevated liver enzymes    [facility], [address], Geovanna Becker [facility], [address] [facility]    3 years ago Encounter for routine adult health examination with abnormal findings    Lizbeth [address], [address] Visit          Future Appointments         Provider Department Appt Notes    In 1 month Deepa Montano [place] [address] or GFRNAA > 50     GFR Evaluation            Passed - In person appointment in the past 12 or next 3 months     Recent Outpatient Visits              7 months ago Eczema, unspecified type Manjinder Israel [facility], [place]    Telemedicine    1 year ago Encounter for routine adult health examination without abnormal findings    [facility] [place]    Office Visit    2 years ago Encounter for routine adult health examination without abnormal findings    Allyne [place], [address], [address] [place] Visit    3 years ago Elevated liver enzymes    [facility], [facility], [address], [address] [address] [facility] [address]    3 years ago Encounter for routine adult health examination with abnormal findings    Manjinder Israel [address], [place]    Office Visit          Future Appointments         Provider Department Appt Notes    In 1 month Michelle Bunch DO [address], [facility], [address] BP reading less than 140/90     BP Readings from Last 1 Encounters:   06/01/22 124/84                  Recent Outpatient Visits              7 months ago Eczema, unspecified type    Allyne [place], [address], [place]    Telemedicine    1 year ago Encounter for routine adult health examination without abnormal findings    [address], [facility], [address], [address] [place] [place] Visit    2 years ago Encounter for routine adult health examination without abnormal findings    Allyne [place], [address], [address] [place] Visit    3 years ago Elevated liver enzymes    [facility], [facility], [address], [address] [address]    3 years ago Encounter for routine adult health examination with abnormal findings    [address], [facility], [address], [address] [place] Visit          Future Appointments         Provider Department Appt Notes    In 1 month Ruby Belle Garcia, [address] [facility], [facility]

## 2023-12-04 ENCOUNTER — PATIENT MESSAGE (OUTPATIENT)
Facility: CLINIC | Age: 48
End: 2023-12-04

## 2023-12-04 NOTE — TELEPHONE ENCOUNTER
Refill passed per [facility] protocol.    Requested Prescriptions   Pending Prescriptions Disp Refills    SERTRALINE 50 MG Oral Tab [Pharmacy Med Name: SERTRALINE HCL 50 MG TABLET] 135 tablet 1     Sig: TAKE 1 AND 1/2 TABLETS BY MOUTH DAILY       Psychiatric Non-Scheduled (Anti-Anxiety) Passed - 12/2/2023  7:58 AM        Passed - In person appointment or virtual visit in the past 6 mos or appointment in next 3 mos     Recent Outpatient Visits              7 months ago Eczema, unspecified type    [facility] Latanya Velazquez DO    Telemedicine    1 year ago Encounter for routine adult health examination without abnormal findings    [facility] [place] Latanya Velazquez DO    Office Visit    2 years ago Encounter for routine adult health examination without abnormal findings    [facility] [place] Latanya Velazquez DO    Office Visit    3 years ago Elevated liver enzymes    [facility] Latanya Velazquez DO    Telemedicine    4 years ago Encounter for routine adult health examination with abnormal findings    [facility] [place] Latanya Velazquez DO    Office Visit          Future Appointments         Provider Department Appt Notes    In 1 month Lat[facility]quez [facility]                    Recent Outpatient Visits              7 months ago Eczema, unspecified type    [facility] Latanya Velazquez DO    Telemedicine    1 year ago Encounter for routine adult health examination without abnormal findings    [facility] [place] Latanya Velazquez DO    Office Visit    2 years ago Encounter for routine adult health examination without abnormal findings    [facility] [place] Latanya Velazquez DO    Office Visit     3 years ago Elevated liver enzymes    [facility], [facility] Latanya Velazquez DO    Telemedicine    4 years ago Encounter for routine adult health examination with abnormal findings    [facility], [facility] Latanya Velazquez DO    Office Visit            Future Appointments         Provider Department Appt Notes    In 1 month Marcus, Latanya, DO [facility], [facility]

## 2023-12-06 NOTE — TELEPHONE ENCOUNTER
From: Peter Chung  To: Cruzito Chema  Sent: 12/4/2023 6:47 PM CST  Subject: Ativan? Hi Dr Nicol Bess -   Have connected with the [facility] and I'm working through the some of the planning/logistics issues. On a different note, I've had a few pretty significant panic attacks in the last few weeks. I also think I've mentioned that I've been talking to a therapist who specializes in addiction. He recommended that I ask you about switching out the Xanax for Ativan, especially after I go into the in house detox. Thoughts?     Thanks

## 2024-01-08 ENCOUNTER — PATIENT MESSAGE (OUTPATIENT)
Facility: CLINIC | Age: 49
End: 2024-01-08

## 2024-01-09 ENCOUNTER — OFFICE VISIT (OUTPATIENT)
Facility: CLINIC | Age: 49
End: 2024-01-09
Payer: COMMERCIAL

## 2024-01-09 VITALS
WEIGHT: 195 LBS | HEIGHT: 74 IN | DIASTOLIC BLOOD PRESSURE: 82 MMHG | SYSTOLIC BLOOD PRESSURE: 128 MMHG | HEART RATE: 86 BPM | BODY MASS INDEX: 25.03 KG/M2 | OXYGEN SATURATION: 96 %

## 2024-01-09 DIAGNOSIS — Z12.5 PROSTATE CANCER SCREENING: ICD-10-CM

## 2024-01-09 DIAGNOSIS — E78.2 MIXED HYPERLIPIDEMIA: ICD-10-CM

## 2024-01-09 DIAGNOSIS — Z00.00 ENCOUNTER FOR ROUTINE ADULT HEALTH EXAMINATION WITHOUT ABNORMAL FINDINGS: Primary | ICD-10-CM

## 2024-01-09 DIAGNOSIS — Z12.11 COLON CANCER SCREENING: ICD-10-CM

## 2024-01-09 DIAGNOSIS — F41.9 ANXIETY: ICD-10-CM

## 2024-01-09 DIAGNOSIS — I10 HYPERTENSION WITH GOAL BLOOD PRESSURE LESS THAN 140/90: ICD-10-CM

## 2024-01-09 PROCEDURE — 90750 HZV VACC RECOMBINANT IM: CPT | Performed by: FAMILY MEDICINE

## 2024-01-09 PROCEDURE — 3079F DIAST BP 80-89 MM HG: CPT | Performed by: FAMILY MEDICINE

## 2024-01-09 PROCEDURE — 90471 IMMUNIZATION ADMIN: CPT | Performed by: FAMILY MEDICINE

## 2024-01-09 PROCEDURE — 99396 PREV VISIT EST AGE 40-64: CPT | Performed by: FAMILY MEDICINE

## 2024-01-09 PROCEDURE — 3074F SYST BP LT 130 MM HG: CPT | Performed by: FAMILY MEDICINE

## 2024-01-09 PROCEDURE — 99214 OFFICE O/P EST MOD 30 MIN: CPT | Performed by: FAMILY MEDICINE

## 2024-01-09 PROCEDURE — 3008F BODY MASS INDEX DOCD: CPT | Performed by: FAMILY MEDICINE

## 2024-01-09 RX ORDER — MELATONIN
100 DAILY
COMMUNITY

## 2024-01-09 RX ORDER — GABAPENTIN 300 MG/1
300 CAPSULE ORAL 3 TIMES DAILY
COMMUNITY

## 2024-01-09 RX ORDER — FOLIC ACID 1 MG/1
TABLET ORAL DAILY
COMMUNITY

## 2024-01-09 RX ORDER — HYDROXYZINE HYDROCHLORIDE 25 MG/1
25 TABLET, FILM COATED ORAL EVERY 6 HOURS PRN
COMMUNITY

## 2024-01-09 RX ORDER — MULTIVIT-MIN/IRON FUM/FOLIC AC 7.5 MG-4
1 TABLET ORAL DAILY
COMMUNITY

## 2024-01-09 RX ORDER — TRAZODONE HYDROCHLORIDE 50 MG/1
50 TABLET ORAL NIGHTLY PRN
COMMUNITY

## 2024-01-09 RX ORDER — ACETYLCYSTEINE 600 MG
CAPSULE ORAL
COMMUNITY
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
## 2024-01-17 DIAGNOSIS — I10 HYPERTENSION WITH GOAL BLOOD PRESSURE LESS THAN 140/90: ICD-10-CM

## 2024-01-17 RX ORDER — METOPROLOL SUCCINATE 50 MG/1
75 TABLET, EXTENDED RELEASE ORAL DAILY
Qty: 135 TABLET | Refills: 3 | Status: SHIPPED | OUTPATIENT
Start: 2024-01-17

## 2024-01-17 NOTE — TELEPHONE ENCOUNTER
Please review. Protocol failed / No protocol.    Requested Prescriptions   Pending Prescriptions Disp Refills    METOPROLOL SUCCINATE ER 50 MG Oral Tablet 24 Hr [Pharmacy Med Name: METOPROLOL SUCC ER 50 MG TAB] 135 tablet 0     Sig: TAKE 1 AND 1/2 TABLETS BY MOUTH DAILY       Hypertensive Medications Protocol Failed - 1/17/2024 12:21 AM        Failed - CMP or BMP in past 6 months     No results found for this or any previous visit (from the past 4392 hour(s)).            Failed - EGFRCR or GFRNAA > 50     GFR Evaluation            Passed - In person appointment in the past 12 or next 3 months     Recent Outpatient Visits              1 week ago Encounter for routine adult health examination without abnormal findings    [facility]    [facility]    8 months ago Eczema, unspecified type    [facility] [place],     Telemedicine    1 year ago Encounter for routine adult health examination without abnormal findings    [facility]     Office Visit    2 years ago Encounter for routine adult health examination without abnormal findings    [facility] [place] Santacruz Alison,     Office Visit    3 years ago Elevated liver enzymes    [facility] [place] Tre[place],     Telemedicine                      Passed - Last BP reading less than 140/90     BP Readings from Last 1 Encounters:   01/09/24 128/82               Passed - In person appointment or virtual visit in the past 6 months     Recent Outpatient Visits              1 week ago Encounter for routine adult health examination without abnormal findings    [facility]    Office Visit    8 months ago Eczema, unspecified type    [facility], [place],  Latanya,     Telemedicine    1 year ago Encounter for routine adult health examination without abnormal findings    [facility] Santac[facility] Alison,     Office Visit    2 years ago Encounter for routine adult health examination without abnormal findings    [facility] [address], [place] Marcus, Latanya,     Office Visit    3 years ago Elevated liver enzymes    [facility] [address], [place] Marcus, Latanya,     Telemedicine                           Recent Outpatient Visits              1 week ago Encounter for routine adult health examination without abnormal findings    [facility] [address], [place] Marcus, Latanya,     Office Visit    8 months ago Eczema, unspecified type    [facility] [address], [place] Marcus, Latanya,     Telemedicine    1 year ago Encounter for routine adult health examination without abnormal findings    [facility] [address], [place] Marcus, Latanya,     Office Visit    2 years ago Encounter for routine adult health examination without abnormal findings    [facility] [address], [place] Marcus, Latanya,     Office Visit    3 years ago Elevated liver enzymes    [facility] [address], [place], Latanya,     Telemedicine

## 2024-03-05 RX ORDER — HYDROCHLOROTHIAZIDE 12.5 MG/1
12.5 TABLET ORAL DAILY
Qty: 90 TABLET | Refills: 0 | Status: SHIPPED | OUTPATIENT
Start: 2024-03-05

## 2024-03-06 NOTE — ED INITIAL ASSESSMENT (HPI)
"   03/06/24 1614   Group Therapy Session   Group Attendance attended group session   Time Session Began 1400   Time Session Ended 1500   Total Time (minutes) 55   Total # Attendees 7   Group Type   (Therapeutic Recreation)   Group Topic Covered leisure exploration/use of leisure time;self-care activities;balanced lifestyle;coping skills/lifestyle management   Group Session Detail Leisure education: Stress worksheet. Group game \"Grounded for Life\" for elevation of mood and increased coping strategies through humor.   Patient Response/Contribution cooperative with task  (overly friendly with peer T. Sarcastic towards peers.)   Patient Participation Detail \"My stress level yesterday was just right, normal for me.  Yesterday evening I had an anxiety attack.  Today, my stress level is better, but still a little more than I'd like.  I was stressed when my parent's visited and it got me focused on my future.  I have tried to reduce my stress by listening to music, reading and talking to people.\"     UZIEL Cao  " Flu shot only.

## 2024-03-06 NOTE — TELEPHONE ENCOUNTER
Please see reaction warning for sertraline and trazodone.  Mychart reminder sent to patient to complete labs.  Please advise on refill request.    Requested Prescriptions     Pending Prescriptions Disp Refills    HYDROCHLOROTHIAZIDE 12.5 MG Oral Tab [Pharmacy Med Name: HYDROCHLOROTHIAZIDE 12.5 MG TB] 90 tablet 0     Sig: TAKE 1 TABLET BY MOUTH EVERY DAY    SERTRALINE 50 MG Oral Tab [Pharmacy Med Name: SERTRALINE HCL 50 MG TABLET] 135 tablet 0     Sig: TAKE 1 AND 1/2 TABLETS BY MOUTH DAILY      Recent Visits  Date Type Provider Dept   01/09/24 Office Visit Latanya Velazquez DO [facility]   Showing recent visits within past 540 days with a meds authorizing provider and meeting all other requirements  Future Appointments  No visits were found meeting these conditions.  Showing future appointments within next 150 days with a meds authorizing provider and meeting all other requirements     Requested Prescriptions   Pending Prescriptions Disp Refills    HYDROCHLOROTHIAZIDE 12.5 MG Oral Tab [Pharmacy Med Name: HYDROCHLOROTHIAZIDE 12.5 MG TB] 90 tablet 0     Sig: TAKE 1 TABLET BY MOUTH EVERY DAY       Hypertension Medications Protocol Failed - 3/4/2024 12:24 AM        Failed - CMP or BMP in past 12 months        Failed - EGFRCR or GFRNAA > 50     GFR Evaluation            Passed - Last BP reading less than 140/90     BP Readings from Last 1 Encounters:   01/09/24 128/82               Passed - In person appointment or virtual visit in the past 12 mos or appointment in next 3 mos     Recent Outpatient Visits              1 month ago Encounter for routine adult health examination without abnormal findings    [facility] [place]    Office Visit    10 months ago Eczema, unspecified type    [facility] [place]    Telemedicine    1 year ago Encounter for routine adult health examination without abnormal findings    [facility] [place] Santac[facility] Alison, DO    Office Visit    2 years ago Encounter for routine adult health examination without abnormal findings    [facility] [place] [facility]     Office Visit    3 years ago Elevated liver enzymes    [facility] [place] [facility],     Telemedicine                        SERTRALINE 50 MG Oral Tab [Pharmacy Med Name: SERTRALINE HCL 50 MG TABLET] 135 tablet 0     Sig: TAKE 1 AND 1/2 TABLETS BY MOUTH DAILY       Psychiatric Non-Scheduled (Anti-Anxiety) Passed - 3/4/2024 12:24 AM        Passed - In person appointment or virtual visit in the past 6 mos or appointment in next 3 mos     Recent Outpatient Visits              1 month ago Encounter for routine adult health examination without abnormal findings    [facility] [place] Santac[facility] [place], DO    Office Visit    10 months ago Eczema, unspecified type    [facility] [place] [facility] [facility]    Telemedicine    1 year ago Encounter for routine adult health examination without abnormal findings    [facility] [place] [facility] [place]    Office Visit    2 years ago Encounter for routine adult health examination without abnormal findings    [facility]     Office Visit    3 years ago Elevated liver enzymes    [facility]    [facility]medicine                      Passed - Depression Screening completed within the past 12 months               Recent Outpatient Visits              1 month ago Encounter for routine adult health examination without abnormal findings    [facility] [place] Santacruz [place], DO    Office Visit    10 months ago Eczema, unspecified type    [facility] [place]  Latanya Velazquez,     Telemedicine    1 year ago Encounter for routine adult health examination without abnormal findings    [facility] [place] Latanya Velazquez DO    Office Visit    2 years ago Encounter for routine adult health examination without abnormal findings    [facility]    Office Visit    3 years ago Elevated liver enzymes    [facility] Latanya Velazquez,     Telemedicine

## 2024-04-12 RX ORDER — GABAPENTIN 300 MG/1
300 CAPSULE ORAL 3 TIMES DAILY
Refills: 0 | OUTPATIENT
Start: 2024-04-12

## 2024-05-07 RX ORDER — GABAPENTIN 300 MG/1
300 CAPSULE ORAL 3 TIMES DAILY
Qty: 270 CAPSULE | Refills: 1 | Status: SHIPPED | OUTPATIENT
Start: 2024-05-07

## 2024-05-07 RX ORDER — HYDROCHLOROTHIAZIDE 12.5 MG/1
12.5 TABLET ORAL DAILY
Qty: 90 TABLET | Refills: 1 | Status: SHIPPED | OUTPATIENT
Start: 2024-05-07

## 2024-05-07 NOTE — TELEPHONE ENCOUNTER
Patient is calling requesting refill be filled prior to his trip per patient is leaving Saturday. Please follow up with patient if medication is unable to be filled.

## 2024-05-07 NOTE — TELEPHONE ENCOUNTER
Pt requesting rx- rx gabapentin on med hx   Patient Comment: Will be traveling [place]   Please review.Protocol failed/ No protocol      Requested Prescriptions   Pending Prescriptions Disp Refills    gabapentin 300 MG Oral Cap  0     Sig: Take 1 capsule (300 mg total) by mouth 3 (three) times daily.       Neurology Medications Passed - 5/7/2024 11:41 AM        Passed - In person appointment or virtual visit in the past 6 mos or appointment in next 3 mos     Recent Outpatient Visits              3 months ago Encounter for routine adult health examination without abnormal findings    [facility] [place] Latanya Velazquez,     Office Visit    1 year ago Eczema, unspecified type    [facility] Latanya Velazquez,     Telemedicine    1 year ago Encounter for routine adult health examination without abnormal findings    [facility] Latanya Velazquez DO    Office Visit    2 years ago Encounter for routine adult health examination without abnormal findings    [facility] Latanya Velazquez DO    Office Visit    3 years ago Elevated liver enzymes    [facility] Latanya Velazquez,     Telemedicine                        hydroCHLOROthiazide 12.5 MG Oral Tab 90 tablet 0     Sig: Take 1 tablet (12.5 mg total) by mouth daily.       Hypertension Medications Protocol Failed - 5/7/2024 11:41 AM        Failed - CMP or BMP in past 12 months        Failed - EGFRCR or GFRNAA > 50     GFR Evaluation            Passed - Last BP reading less than 140/90     BP Readings from Last 1 Encounters:   01/09/24 128/82               Passed - In person appointment or virtual visit in the past 12 mos or appointment in next 3 mos     Recent Outpatient Visits              3 months ago Encounter for routine adult health examination without abnormal findings    [place]  [facility]    Office Visit    1 year ago Eczema, unspecified type    [facility]     Telemedicine    1 year ago Encounter for routine adult health examination without abnormal findings    [facility] [place]    Office Visit    2 years ago Encounter for routine adult health examination without abnormal findings    [facility] [place] DO    Office Visit    3 years ago Elevated liver enzymes    [facility] [place] Santac[facility] [place], DO    Telemedicine                                Recent Outpatient Visits              3 months ago Encounter for routine adult health examination without abnormal findings    [facility] [place] [facility] DO    Office Visit    1 year ago Eczema, unspecified type    [facility] [place] Santacruz Alison,     Telemedicine    1 year ago Encounter for routine adult health examination without abnormal findings    [facility] [place] Santac[place] Al[place],     Office Visit    2 years ago Encounter for routine adult health examination without abnormal findings    [facility] [place] Santac[facility] [place] DO    Office Visit    3 years ago Elevated liver enzymes    [facility] [place] Santacruz [place], DO    Telemedicine

## 2024-07-01 ENCOUNTER — PATIENT MESSAGE (OUTPATIENT)
Facility: CLINIC | Age: 49
End: 2024-07-01

## 2024-07-01 RX ORDER — GABAPENTIN 300 MG/1
300 CAPSULE ORAL 3 TIMES DAILY
Qty: 270 CAPSULE | Refills: 1 | Status: CANCELLED | OUTPATIENT
Start: 2024-07-01

## 2024-07-01 NOTE — TELEPHONE ENCOUNTER
From: Jonah Feliz  To: Latanya Velazquez  Sent: 7/1/2024 7:38 AM CDT  Subject: Meds    Hi -   Just put in a request for a gabapentin refill - I’m headed out of town on Wednesday and just noticed I’m really low. Appreciate it.     Also - I’d like to review all my medications as soon as reasonable. I feel like a lots changed in the last six months, and not sure my meds reflect that. Should I do labs, then a video visit? First appointment I could get was in October, but hoping there might be something sooner.     Thanks!  Riky

## 2024-07-01 NOTE — TELEPHONE ENCOUNTER
Dr. Velazquez please advise the patient reports he is taking 600 mg of Gabapentin TID and 300 mg TID was sent to the pharmacy.

## 2024-07-02 RX ORDER — GABAPENTIN 600 MG/1
600 TABLET ORAL 3 TIMES DAILY
Qty: 270 TABLET | Refills: 1 | Status: SHIPPED | OUTPATIENT
Start: 2024-07-02

## 2024-07-02 NOTE — TELEPHONE ENCOUNTER
Patient states that he takes Gabapentin 600 mg three times a day. It was originally prescribed this way by Dr. Peterson. Please also see MyCharts from 7/1/24. Medication pended for your review and approval.

## 2024-07-20 ENCOUNTER — HOSPITAL ENCOUNTER (OUTPATIENT)
Age: 49
Discharge: HOME | End: 2024-07-20
Payer: COMMERCIAL

## 2024-07-20 VITALS
OXYGEN SATURATION: 100 % | DIASTOLIC BLOOD PRESSURE: 75 MMHG | TEMPERATURE: 98 F | HEART RATE: 58 BPM | RESPIRATION RATE: 20 BRPM | SYSTOLIC BLOOD PRESSURE: 119 MMHG

## 2024-07-20 DIAGNOSIS — S86.812A STRAIN OF CALF MUSCLE, LEFT, INITIAL ENCOUNTER: ICD-10-CM

## 2024-07-20 DIAGNOSIS — H61.23 BILATERAL IMPACTED CERUMEN: Primary | ICD-10-CM

## 2024-07-20 PROCEDURE — 69209 REMOVE IMPACTED EAR WAX UNI: CPT | Performed by: NURSE PRACTITIONER

## 2024-07-20 PROCEDURE — 99203 OFFICE O/P NEW LOW 30 MIN: CPT | Performed by: NURSE PRACTITIONER

## 2024-07-20 NOTE — ED INITIAL ASSESSMENT (HPI)
Patient presents with bilateral ear muffled hearing x 2 weeks, denies pain. Also c/o left calf pain x 1 day, states felt pain yesterday while jogging.

## 2024-07-20 NOTE — ED PROVIDER NOTES
Patient Seen in: [facility]      History     Chief Complaint   Patient presents with    Ear Problem Pain     Stated Complaint: Ear Hearing Loss    Subjective:   HPI  Patient is a 49-year-old male that presents to the immediate care center with 2 distinct concerns.  Primarily, he has been experiencing progressively muffled hearing for the last 2 weeks.  He denies precipitating trauma; denies headache or dizziness.  He is never experienced this before.  He also has concern for pain to the left calf that started a couple of days ago while he was running.  Pain was immediate during running and has been intermittent since.  It is isolated to the calf, does not radiate.  He is able to ambulate without difficulty.  He acknowledges the pain feels better today than it did yesterday.          Objective:   Past Medical History:    Arthritis    Right foot    COVID-19    Essential hypertension    High cholesterol    Sarcoidosis    Stress              Past Surgical History:   Procedure Laterality Date    Tonsillectomy                  No pertinent social history.            Review of Systems   Constitutional:  Negative for chills and fever.   HENT:  Positive for hearing loss. Negative for congestion and sore throat.    Respiratory:  Negative for cough.    Skin:  Negative for rash.   Neurological:  Negative for dizziness, weakness and headaches.       Positive for stated Chief Complaint: Ear Problem Pain    Other systems are as noted in HPI.  Constitutional and vital signs reviewed.      All other systems reviewed and negative except as noted above.    Physical Exam     ED Triage Vitals [07/20/24 1121]   /75   Pulse 58   Resp 20   Temp 97.9 °F (36.6 °C)   Temp src Temporal   SpO2 100 %   O2 Device None (Room air)       Current Vitals:   Vital Signs  BP: 119/75  Pulse: 58  Resp: 20  Temp: 97.9 °F (36.6 °C)  Temp src: Temporal    Oxygen Therapy  SpO2: 100 %  O2 Device: None (Room air)            Physical  Exam  Vitals and nursing note reviewed.   Constitutional:       General: He is not in acute distress.     Appearance: He is not ill-appearing.   HENT:      Right Ear: There is impacted cerumen.      Left Ear: There is impacted cerumen.      Nose: Nose normal.   Eyes:      Conjunctiva/sclera: Conjunctivae normal.   Pulmonary:      Effort: Pulmonary effort is normal. No respiratory distress.   Musculoskeletal:      Left knee: Normal.      Left lower leg: No swelling or bony tenderness. No edema.      Left ankle: Normal.   Skin:     General: Skin is warm and dry.   Neurological:      Mental Status: He is alert and oriented to person, place, and time.   Psychiatric:         Behavior: Behavior normal.               ED Course   Labs Reviewed - No data to display     Both ears were irrigated by tech.  Large amounts of cerumen were removed bilaterally.  Patient states he has had instant and full restoration of hearing.  He has no pain.    After irrigation: Ears inspected, tympanic membranes are intact bilaterally, there is no erythema or drainage.              MDM      Patient was advised that as he has no bony tenderness to the left lower extremity, he most likely has sustained a strain to the calf muscle, given the history of mechanism.  We discussed limitation of plain film x-rays.  He will follow with primary care provider next week if leg pain continues.                                 Medical Decision Making  Differential diagnoses considered include, but are not exclusive of: Acute otitis media, suppurative; acute otitis externa; acute otitis media, serous; ruptured tympanic membrane; mastoiditis.    Differential diagnoses considered today include, but are not exclusive of: DVT, fracture, dislocation, strain, sprain, vascular compromise, and nerve impingement syndrome.      Problems Addressed:  Bilateral impacted cerumen: self-limited or minor problem  Strain of calf muscle, left, initial encounter: self-limited or  minor problem    Risk  OTC drugs.        Disposition and Plan     Clinical Impression:  1. Bilateral impacted cerumen    2. Strain of calf muscle, left, initial encounter         Disposition:  Discharge  7/20/2024 12:04 pm    Follow-up:  Latanya Velazquez DO  [address] [place] [address] [id number] [phone]      As needed          Medications Prescribed:  Discharge Medication List as of 7/20/2024 12:08 PM

## 2024-08-29 NOTE — TELEPHONE ENCOUNTER
Refill passed per [facility] protocol.    Please review pended refill request as unable to refill due to high/very high interaction warning copied here:    Current Warnings (1 unfiltered, 3 filtered)Show filtered (3)  High  Drug-Drug: sertraline and traZODoneAdditive serotonergic effects may occur during coadministration of selective serotonin reuptake inhibitors (SSRIs) and Serotonergic Non-Opioid CNS Depressants, and the risk of developing serotonin syndrome may be increased.  Details  Override reason        sertraline 50 MG Oral Tab [Pharmacy Med Name: SERTRALINE HCL 50 MG TABLET]  Prescription. Reordered.  traZODone 50 MG Oral Tab      Requested Prescriptions   Pending Prescriptions Disp Refills    SERTRALINE 50 MG Oral Tab [Pharmacy Med Name: SERTRALINE HCL 50 MG TABLET] 135 tablet 1     Sig: TAKE 1 AND 1/2 TABLETS BY MOUTH DAILY       Psychiatric Non-Scheduled (Anti-Anxiety) Passed - 8/28/2024 12:30 AM        Passed - In person appointment or virtual visit in the past 6 mos or appointment in next 3 mos     Recent Outpatient Visits              7 months ago Encounter for routine adult health examination without abnormal findings    [facility] [place]    Office Visit    1 year ago Eczema, unspecified type    [facility] [place] [facility]    Telemedicine    2 years ago Encounter for routine adult health examination without abnormal findings    [facility] [address] [place]    Office Visit    3 years ago Encounter for routine adult health examination without abnormal findings    [facility] [place] Santac[place], DO    Office Visit    4 years ago Elevated liver enzymes    [facility] [place] Santac[place], DO    Telemedicine          Future Appointments         Provider Department Appt Notes    In 1 month Marcus  DO Latanya [facility] I’d like to review my medications.                    Passed - Depression Screening completed within the past 12 months           Recent Outpatient Visits              7 months ago Encounter for routine adult health examination without abnormal findings    [facility] [place] Latanya Velazquez,     Office Visit    1 year ago Eczema, unspecified type    [facility] [place] Latanya Velazquez,     Telemedicine    2 years ago Encounter for routine adult health examination without abnormal findings    [facility] [place] Latanya Velazquez,     Office Visit    3 years ago Encounter for routine adult health examination without abnormal findings    [facility] [place] Latanya Velazquez,     Office Visit    4 years ago Elevated liver enzymes    [facility] [place] Latanya Velazquez,     Telemedicine          Future Appointments         Provider Department Appt Notes    In 1 month Latanya Velazquez DO [facility] I’d like to review my medications.

## 2024-09-04 RX ORDER — HYDROCHLOROTHIAZIDE 12.5 MG/1
12.5 TABLET ORAL DAILY
Qty: 90 TABLET | Refills: 1 | Status: SHIPPED | OUTPATIENT
Start: 2024-09-04

## 2024-09-04 NOTE — TELEPHONE ENCOUNTER
Please Review. Protocol Failed; No Protocol     Requested Prescriptions   Pending Prescriptions Disp Refills    HYDROCHLOROTHIAZIDE 12.5 MG Oral Tab [Pharmacy Med Name: HYDROCHLOROTHIAZIDE 12.5 MG TB] 90 tablet 1     Sig: TAKE 1 TABLET BY MOUTH EVERY DAY       Hypertension Medications Protocol Failed - 9/1/2024  3:30 PM        Failed - CMP or BMP in past 12 months        Failed - EGFRCR or GFRNAA > 50     GFR Evaluation            Passed - Last BP reading less than 140/90     BP Readings from Last 1 Encounters:   07/20/24 119/75               Passed - In person appointment or virtual visit in the past 12 mos or appointment in next 3 mos     Recent Outpatient Visits              7 months ago Encounter for routine adult health examination without abnormal findings    [facility] [place] Latanya Velazquez DO    Office Visit    1 year ago Eczema, unspecified type    [facility] [place] Latanya Velazquez DO    Telemedicine    2 years ago Encounter for routine adult health examination without abnormal findings    [facility] [place] Latanya Velazquez DO    Office Visit    3 years ago Encounter for routine adult health examination without abnormal findings    [facility] [place] Latanya Velazquez DO    Office Visit    4 years ago Elevated liver enzymes    [facility] [place] Latanya Velazquez DO    Telemedicine          Future Appointments         Provider Department Appt Notes    In 1 month Lat[facility] I’d like to review my medications.                           Future Appointments         Provider Department Appt Notes    In 1 month Latanya [facility]quez DO [facility] I’d like to review my medications.          Recent Outpatient Visits              7 months ago Encounter for routine adult  health examination without abnormal findings    [facility] [address], [place] Marcus, Latanya,     Office Visit    1 year ago Eczema, unspecified type    [facility] [address], [place] Marcus, Latanya,     Telemedicine    2 years ago Encounter for routine adult health examination without abnormal findings    [facility] [place], Alison, DO    Office Visit    3 years ago Encounter for routine adult health examination without abnormal findings    [facility] [place], Alison, DO    Office Visit    4 years ago Elevated liver enzymes    [facility], [address], [place] Marcus, Latanya,     Telemedicine

## 2024-10-03 ENCOUNTER — TELEPHONE (OUTPATIENT)
Facility: CLINIC | Age: 49
End: 2024-10-03

## 2024-10-03 RX ORDER — GABAPENTIN 600 MG/1
600 TABLET ORAL 3 TIMES DAILY
Qty: 270 TABLET | Refills: 1 | OUTPATIENT
Start: 2024-10-03

## 2024-10-03 NOTE — TELEPHONE ENCOUNTER
Outpatient Medication Detail     Disp Refills Start End    gabapentin 600 MG Oral Tab 270 tablet 1 7/2/2024 --    Sig - Route: Take 1 tablet (600 mg total) by mouth 3 (three) times daily. - Oral    Sent to pharmacy as: Gabapentin 600 MG Oral Tablet (Neurontin)    E-Prescribing Status: Receipt confirmed by pharmacy (7/2/2024  3:04 PM CDT)      Pharmacy    [id number] - [place], [place] - [address] [place] [address], [phone], [phone]

## 2024-10-03 NOTE — TELEPHONE ENCOUNTER
Called patient and advised open lab orders from 1/2024. He verbalized understanding.   
Received a call from a patient requesting full panel blood test  be placed so that he could complete them prior his physical visit with   Dr. Velazquez schedule for 10/23/2024.    Please assist .  
What Type Of Note Output Would You Prefer (Optional)?: Bullet Format
What Is The Reason For Today's Visit?: Full Body Skin Examination
What Is The Reason For Today's Visit? (Being Monitored For X): concerning skin lesions on an annual basis
Additional History: Patient has no lesions of concern today.

## 2024-10-23 ENCOUNTER — OFFICE VISIT (OUTPATIENT)
Facility: CLINIC | Age: 49
End: 2024-10-23
Payer: COMMERCIAL

## 2024-10-23 ENCOUNTER — LAB ENCOUNTER (OUTPATIENT)
Dept: LAB | Facility: REFERENCE LAB | Age: 49
End: 2024-10-23
Attending: FAMILY MEDICINE
Payer: COMMERCIAL

## 2024-10-23 VITALS
BODY MASS INDEX: 25.03 KG/M2 | DIASTOLIC BLOOD PRESSURE: 82 MMHG | SYSTOLIC BLOOD PRESSURE: 122 MMHG | OXYGEN SATURATION: 96 % | WEIGHT: 195 LBS | HEART RATE: 64 BPM | HEIGHT: 74 IN

## 2024-10-23 DIAGNOSIS — I10 HYPERTENSION WITH GOAL BLOOD PRESSURE LESS THAN 140/90: ICD-10-CM

## 2024-10-23 DIAGNOSIS — E78.2 MIXED HYPERLIPIDEMIA: ICD-10-CM

## 2024-10-23 DIAGNOSIS — F41.9 ANXIETY: ICD-10-CM

## 2024-10-23 DIAGNOSIS — Z12.5 PROSTATE CANCER SCREENING: ICD-10-CM

## 2024-10-23 DIAGNOSIS — I10 HYPERTENSION WITH GOAL BLOOD PRESSURE LESS THAN 140/90: Primary | ICD-10-CM

## 2024-10-23 DIAGNOSIS — Z00.00 ENCOUNTER FOR ROUTINE ADULT HEALTH EXAMINATION WITHOUT ABNORMAL FINDINGS: ICD-10-CM

## 2024-10-23 PROBLEM — F10.10 ALCOHOL ABUSE: Status: RESOLVED | Noted: 2022-06-01 | Resolved: 2024-10-23

## 2024-10-23 LAB
ALBUMIN SERPL-MCNC: 4.8 G/DL (ref 3.2–4.8)
ALBUMIN/GLOB SERPL: 1.8 {RATIO} (ref 1–2)
ALP LIVER SERPL-CCNC: 69 U/L
ALT SERPL-CCNC: 29 U/L
ANION GAP SERPL CALC-SCNC: 7 MMOL/L (ref 0–18)
AST SERPL-CCNC: 28 U/L (ref ?–34)
BASOPHILS # BLD AUTO: 0.03 X10(3) UL (ref 0–0.2)
BASOPHILS NFR BLD AUTO: 0.6 %
BILIRUB SERPL-MCNC: 0.6 MG/DL (ref 0.3–1.2)
BUN BLD-MCNC: 13 MG/DL (ref 9–23)
BUN/CREAT SERPL: 14.3 (ref 10–20)
CALCIUM BLD-MCNC: 10.3 MG/DL (ref 8.7–10.4)
CHLORIDE SERPL-SCNC: 103 MMOL/L (ref 98–112)
CHOLEST SERPL-MCNC: 275 MG/DL (ref ?–200)
CO2 SERPL-SCNC: 30 MMOL/L (ref 21–32)
COMPLEXED PSA SERPL-MCNC: 0.49 NG/ML (ref ?–4)
CREAT BLD-MCNC: 0.91 MG/DL
CREAT UR-SCNC: 167 MG/DL
DEPRECATED RDW RBC AUTO: 41.1 FL (ref 35.1–46.3)
EGFRCR SERPLBLD CKD-EPI 2021: 102 ML/MIN/1.73M2 (ref 60–?)
EOSINOPHIL # BLD AUTO: 0.12 X10(3) UL (ref 0–0.7)
EOSINOPHIL NFR BLD AUTO: 2.4 %
ERYTHROCYTE [DISTWIDTH] IN BLOOD BY AUTOMATED COUNT: 12.2 % (ref 11–15)
EST. AVERAGE GLUCOSE BLD GHB EST-MCNC: 105 MG/DL (ref 68–126)
FASTING PATIENT LIPID ANSWER: NO
FASTING STATUS PATIENT QL REPORTED: NO
GLOBULIN PLAS-MCNC: 2.7 G/DL (ref 2–3.5)
GLUCOSE BLD-MCNC: 86 MG/DL (ref 70–99)
HBA1C MFR BLD: 5.3 % (ref ?–5.7)
HCT VFR BLD AUTO: 40.8 %
HDLC SERPL-MCNC: 61 MG/DL (ref 40–59)
HGB BLD-MCNC: 14.1 G/DL
IMM GRANULOCYTES # BLD AUTO: 0.01 X10(3) UL (ref 0–1)
IMM GRANULOCYTES NFR BLD: 0.2 %
LDLC SERPL CALC-MCNC: 201 MG/DL (ref ?–100)
LYMPHOCYTES # BLD AUTO: 1.58 X10(3) UL (ref 1–4)
LYMPHOCYTES NFR BLD AUTO: 31.1 %
MCH RBC QN AUTO: 31.8 PG (ref 26–34)
MCHC RBC AUTO-ENTMCNC: 34.6 G/DL (ref 31–37)
MCV RBC AUTO: 91.9 FL
MICROALBUMIN UR-MCNC: <0.3 MG/DL
MONOCYTES # BLD AUTO: 0.75 X10(3) UL (ref 0.1–1)
MONOCYTES NFR BLD AUTO: 14.8 %
NEUTROPHILS # BLD AUTO: 2.59 X10 (3) UL (ref 1.5–7.7)
NEUTROPHILS # BLD AUTO: 2.59 X10(3) UL (ref 1.5–7.7)
NEUTROPHILS NFR BLD AUTO: 50.9 %
NONHDLC SERPL-MCNC: 214 MG/DL (ref ?–130)
OSMOLALITY SERPL CALC.SUM OF ELEC: 289 MOSM/KG (ref 275–295)
PLATELET # BLD AUTO: 235 10(3)UL (ref 150–450)
POTASSIUM SERPL-SCNC: 3.8 MMOL/L (ref 3.5–5.1)
PROT SERPL-MCNC: 7.5 G/DL (ref 5.7–8.2)
RBC # BLD AUTO: 4.44 X10(6)UL
SODIUM SERPL-SCNC: 140 MMOL/L (ref 136–145)
TRIGL SERPL-MCNC: 80 MG/DL (ref 30–149)
VLDLC SERPL CALC-MCNC: 17 MG/DL (ref 0–30)
WBC # BLD AUTO: 5.1 X10(3) UL (ref 4–11)

## 2024-10-23 PROCEDURE — 99214 OFFICE O/P EST MOD 30 MIN: CPT | Performed by: FAMILY MEDICINE

## 2024-10-23 PROCEDURE — 83036 HEMOGLOBIN GLYCOSYLATED A1C: CPT

## 2024-10-23 PROCEDURE — 90750 HZV VACC RECOMBINANT IM: CPT | Performed by: FAMILY MEDICINE

## 2024-10-23 PROCEDURE — 80061 LIPID PANEL: CPT

## 2024-10-23 PROCEDURE — 82570 ASSAY OF URINE CREATININE: CPT

## 2024-10-23 PROCEDURE — 85025 COMPLETE CBC W/AUTO DIFF WBC: CPT

## 2024-10-23 PROCEDURE — 80053 COMPREHEN METABOLIC PANEL: CPT

## 2024-10-23 PROCEDURE — 36415 COLL VENOUS BLD VENIPUNCTURE: CPT

## 2024-10-23 PROCEDURE — 90471 IMMUNIZATION ADMIN: CPT | Performed by: FAMILY MEDICINE

## 2024-10-23 PROCEDURE — 82043 UR ALBUMIN QUANTITATIVE: CPT

## 2024-10-23 RX ORDER — ATORVASTATIN CALCIUM 20 MG/1
20 TABLET, FILM COATED ORAL NIGHTLY
Qty: 90 TABLET | Refills: 3 | Status: SHIPPED | OUTPATIENT
Start: 2024-10-23

## 2024-10-23 RX ORDER — CICLOPIROX 1 G/100ML
SHAMPOO TOPICAL
COMMUNITY
Start: 2024-09-09

## 2024-10-23 RX ORDER — TACROLIMUS 1 MG/G
OINTMENT TOPICAL
COMMUNITY
Start: 2024-09-09

## 2024-10-23 RX ORDER — METOPROLOL SUCCINATE 50 MG/1
50 TABLET, EXTENDED RELEASE ORAL DAILY
COMMUNITY
Start: 2024-10-23

## 2024-10-23 RX ORDER — CLOBETASOL PROPIONATE 0.5 MG/ML
SOLUTION TOPICAL
COMMUNITY
Start: 2024-09-09

## 2024-10-23 NOTE — PROGRESS NOTES
CC:    Chief Complaint   Patient presents with    Medication Follow-Up       HPI: 49 year old male here for medication follow-up.  Has been sober since December 30th of 2023.   His lifestyle has changed dramatically since stopping alcohol.   He does have a therapist he sees regularly.   He feels his anxiety is manageable, but still present.   He has been taking Sertraline 75 mg daily since he stopped drinking, and feels it is working well.   He does not take Trazodone as he does not like how he feels when he takes it.    He has not taken Hydroxyzine since February.   He has not been sleeping well over the last two weeks, but that is more related to stress.  He has been taking Gabapentin three times a day, but he is not sure what it is helping. He does plan to try to wean off of it starting next year.   He has been working out 4-5 times a week, and he will occasionally get dizzy or lightheaded later in the day.    He does check his blood pressure at home, and it has been around 120/80.  He tries to drink 4-5 large bottles of water a day.   Will have seltzer water and fruit in the evening.   Breakfast is yogurt, fruit, and granola. Lunch is chicken with an apple and avocado. Dinner is usually light, and will have a light snack in the evening.      ROS:  General:  No fever, no fatigue, no weight changes  HEENT:  Denies congestion or nasal discharge  Cardio:  No chest pain, no palpitations   Pulmonary:  No cough, no SOB  GI:  No N/V/D  Dermatologic:  No rashes  Psych: anxiety and mild depression   Neuro: intermittent lightheadedness     Past Medical History:    Arthritis    Right foot    COVID-19    Essential hypertension    High cholesterol    Sarcoidosis    Stress       Social History     Socioeconomic History    Marital status:      Spouse name: Not on file    Number of children: Not on file    Years of education: Not on file    Highest education level: Not on file   Occupational History    Not on file    Tobacco Use    Smoking status: Former     Current packs/day: 0.00     Types: Cigarettes     Quit date: 11/25/2008     Years since quitting: 15.9    Smokeless tobacco: Never   Vaping Use    Vaping status: Never Used   Substance and Sexual Activity    Alcohol use: Not Currently     Comment: Sober since 12/30/2023    Drug use: No    Sexual activity: Yes     Partners: Female     Birth control/protection: Condom   Other Topics Concern    Caffeine Concern Not Asked    Exercise Yes    Seat Belt Not Asked    Special Diet Not Asked    Stress Concern Not Asked    Weight Concern No   Social History Narrative    Not on file     Social Drivers of Health     Financial Resource Strain: Not on file   Food Insecurity: Not on file   Transportation Needs: Not on file   Physical Activity: Not on file   Stress: Not on file   Social Connections: Not on file   Housing Stability: Not on file       Current Outpatient Medications   Medication Sig Dispense Refill    Ciclopirox 1 % External Shampoo USE SHAMPOO 2-3X PER WEEK      clobetasol 0.05 % External Solution APPLY TO SCALP EVERY NIGHT AT BEDTIME. WASH OUT IN AM      tacrolimus 0.1 % External Ointment APPLY TO FACE/ ARMPIT TWICE DAILY      hydroCHLOROthiazide 12.5 MG Oral Tab Take 1 tablet (12.5 mg total) by mouth daily. 90 tablet 1    SERTRALINE 50 MG Oral Tab TAKE 1 AND 1/2 TABLETS BY MOUTH DAILY 135 tablet 1    gabapentin 600 MG Oral Tab Take 1 tablet (600 mg total) by mouth 3 (three) times daily. 270 tablet 1    metoprolol succinate ER 50 MG Oral Tablet 24 Hr Take 1.5 tablets (75 mg total) by mouth daily. 135 tablet 3    hydrOXYzine 25 MG Oral Tab Take 1 tablet (25 mg total) by mouth every 6 (six) hours as needed for Itching.      folic acid 1 MG Oral Tab Take by mouth daily.      Cholecalciferol 125 MCG (5000 UT) Oral Tab Take 1 tablet (5,000 Units total) by mouth daily.      thiamine 100 MG Oral Tab Take 1 tablet (100 mg total) by mouth daily.      Multiple Vitamins-Minerals  (MULTI-VITAMIN/MINERALS) Oral Tab Take 1 tablet by mouth daily.      triamcinolone 0.1 % External Ointment Apply 1 Application topically 2 (two) times daily. Apply to rough, red areas of the body for two weeks and then stop use for one week. 80 g 0    traZODone 50 MG Oral Tab Take 1 tablet (50 mg total) by mouth nightly as needed for Sleep. (Patient not taking: Reported on 10/23/2024)      hydrocortisone 2.5 % External Ointment Apply 1 Application topically 2 (two) times daily. Apply twice daily to eyelids for two weeks and then stop use for one week prior to using again as needed. (Patient not taking: Reported on 10/23/2024) 30 g 0    atorvastatin 20 MG Oral Tab TAKE 1 TABLET BY MOUTH EVERY NIGHT (Patient not taking: Reported on 10/23/2024) 30 tablet 0       Patient has no known allergies.      Vitals:   Vitals:    10/23/24 0757   BP: 122/82   Pulse: 64   SpO2: 96%   Weight: 195 lb (88.5 kg)   Height: 6' 2\" (1.88 m)       Body mass index is 25.04 kg/m².    Physical:  General:  Alert, appropriate, no acute distress   HEENT: supple, no lymphadenopathy   Cardio:  RRR, no murmurs, S1, S2  Dermatologic:  No rashes or lesions    Assessment and Plan: 49 year old male here to follow-up on medical conditions.     1. Hypertension with goal blood pressure less than 140/90    - Blood pressure well controlled, and will lower dose of Metoprolol to 50 mg daily as he has been feeling more dizzy/lightheaded after exercise  - Plan to wean off Metoprolol fully if blood pressure and heart rate remains well controlled, and will follow-up in 3 months or sooner if concerns   - Continue hydrochlorothiazide 12.5 mg daily   - metoprolol succinate ER 50 MG Oral Tablet 24 Hr; Take 1 tablet (50 mg total) by mouth daily.    2. Anxiety    - Overall well controlled, and will continue with current regimen until he follows up in January  - Also to continue with his therapist    3. Mixed hyperlipidemia    - Repeat lipid panel and CMP to see if  improved since he stopped drinking alcohol 10 months ago, and will determine need for statin or not       Latanya Velazquez DO  10/23/24  8:05 AM

## 2024-11-18 LAB — AMB EXT COVID-19 RESULT: DETECTED

## 2024-11-19 ENCOUNTER — PATIENT MESSAGE (OUTPATIENT)
Facility: CLINIC | Age: 49
End: 2024-11-19

## 2024-11-19 NOTE — TELEPHONE ENCOUNTER
Noted         Future Appointments   Date Time Provider Department Center   1/22/2025  9:30 AM Latanya Velazquez DO [facility]

## 2024-11-20 ENCOUNTER — NURSE TRIAGE (OUTPATIENT)
Facility: CLINIC | Age: 49
End: 2024-11-20

## 2024-11-20 NOTE — TELEPHONE ENCOUNTER
Action Requested: Summary for Provider     []  Critical Lab, Recommendations Needed  [] Need Additional Advice  [x]   FYI    []   Need Orders  [] Need Medications Sent to Pharmacy  []  Other     SUMMARY: Patient stated that he just got back from a work trip on 11/18/2024 and started having cold symptoms. Took a COVID test on 11/18/2024 and it was positive(chart updated). Patient has a cough, congestion, runny nose. No fevers. No shortness of breath or wheezing. No chest pain. No other symptoms. No appointments available today. Video visit made for tomorrow at 1:30pm with Dr Burton. Went over instructions, copay and that provider will be calling from a restricted/unknown number to make sure able to accept/pickup those calls. Patient agreed.     Advised that if any shortness of breath, wheezing, or chest pain to go to urgent care/emergency room for an evaluation. Patient agreed.     [employer] message sent with information on COVID.   Reason for call: Covid  Onset: 11/18/2024    Reason for Disposition   HIGH RISK patient (e.g., weak immune system, age > 64 years, obesity with BMI of 30 or higher, pregnant, chronic lung disease or other chronic medical condition) and COVID symptoms (e.g., cough, fever)  (Exceptions: Already seen by doctor or NP/PA and no new or worsening symptoms.)    Protocols used: Coronavirus (COVID-19) Diagnosed or [id number]

## 2024-11-21 ENCOUNTER — TELEMEDICINE (OUTPATIENT)
Facility: CLINIC | Age: 49
End: 2024-11-21
Payer: COMMERCIAL

## 2024-11-21 DIAGNOSIS — U07.1 COVID-19 VIRUS INFECTION: Primary | ICD-10-CM

## 2024-11-21 PROCEDURE — 99213 OFFICE O/P EST LOW 20 MIN: CPT | Performed by: FAMILY MEDICINE

## 2024-11-21 NOTE — PROGRESS NOTES
HPI:    Patient ID: Jonah Feliz is a 49 year old male who presents for covid.    HPI  Returned from [place] Sunday night.  Sx started Monday.  Sx include runny nose, congestion, cough.   No fevers.  No chest pain/tightness or SOB.   Wondering about paxlovid.   Also concerned about next week. Has family coming in for Thanksgiving including elderly father.     Past Medical History:    Alcohol abuse    Arthritis    Right foot    COVID-19    Essential hypertension    High cholesterol    Sarcoidosis    Stress        Current Outpatient Medications   Medication Sig Dispense Refill    Ciclopirox 1 % External Shampoo USE SHAMPOO 2-3X PER WEEK      clobetasol 0.05 % External Solution APPLY TO SCALP EVERY NIGHT AT BEDTIME. WASH OUT IN AM      tacrolimus 0.1 % External Ointment APPLY TO FACE/ ARMPIT TWICE DAILY      atorvastatin 20 MG Oral Tab Take 1 tablet (20 mg total) by mouth nightly. 90 tablet 3    hydroCHLOROthiazide 12.5 MG Oral Tab Take 1 tablet (12.5 mg total) by mouth daily. 90 tablet 1    SERTRALINE 50 MG Oral Tab TAKE 1 AND 1/2 TABLETS BY MOUTH DAILY 135 tablet 1    gabapentin 600 MG Oral Tab Take 1 tablet (600 mg total) by mouth 3 (three) times daily. 270 tablet 1    folic acid 1 MG Oral Tab Take by mouth daily.      Cholecalciferol 125 MCG (5000 UT) Oral Tab Take 1 tablet (5,000 Units total) by mouth daily.      thiamine 100 MG Oral Tab Take 1 tablet (100 mg total) by mouth daily.      Multiple Vitamins-Minerals (MULTI-VITAMIN/MINERALS) Oral Tab Take 1 tablet by mouth daily.      triamcinolone 0.1 % External Ointment Apply 1 Application topically 2 (two) times daily. Apply to rough, red areas of the body for two weeks and then stop use for one week. 80 g 0    hydrocortisone 2.5 % External Ointment Apply 1 Application topically 2 (two) times daily. Apply twice daily to eyelids for two weeks and then stop use for one week prior to using again as needed. (Patient not taking: Reported on 11/21/2024) 30 g 0         Allergies[1]    Review of Systems   See HPI. Otherwise negative.         There were no vitals taken for this visit.    PHYSICAL EXAM:   Physical Exam  Constitutional:       General: He is not in acute distress.     Appearance: Normal appearance. He is not ill-appearing, toxic-appearing or diaphoretic.   HENT:      Head: Normocephalic and atraumatic.   Pulmonary:      Effort: Pulmonary effort is normal.   Neurological:      General: No focal deficit present.      Mental Status: He is alert.   Psychiatric:         Mood and Affect: Mood normal.         Behavior: Behavior normal.         Thought Content: Thought content normal.         Judgment: Judgment normal.             ASSESSMENT/PLAN:     Encounter Diagnosis   Name Primary?    COVID-19 virus infection Yes       1. COVID-19 virus infection     -Reviewed current CDC guidelines for quarantine.  -He may continue to wear mask at home for the next few days to help prevent transmission to family members.   -Reviewed indications for paxlovid, and will hold off for now.   -Continue supportive care.   -Reviewed warning signs.     Meds This Visit:  Requested Prescriptions      No prescriptions requested or ordered in this encounter       Imaging & Referrals:  None    Please note that the following visit was completed using two-way, real-time interactive audio and video communication.  This has been done in good colleen to provide continuity of care in the best interest of the provider-patient relationship, due to the ongoing public health crisis/national emergency and because of restrictions of visitation.  There are limitations of this visit as no physical exam could be performed.  Every conscious effort was taken to allow for sufficient and adequate time.  This billing was spent on reviewing labs, medications, radiology tests and decision making.  Appropriate medical decision-making and tests are ordered as detailed in the plan of care above.       Rigoberto Burton,  DO  ID#[id number]       [1] No Known Allergies

## 2025-01-03 ENCOUNTER — TELEPHONE (OUTPATIENT)
Facility: CLINIC | Age: 50
End: 2025-01-03

## 2025-01-03 ENCOUNTER — PATIENT MESSAGE (OUTPATIENT)
Facility: CLINIC | Age: 50
End: 2025-01-03

## 2025-01-03 RX ORDER — GABAPENTIN 600 MG/1
600 TABLET ORAL 3 TIMES DAILY
Qty: 270 TABLET | Refills: 1 | Status: SHIPPED | OUTPATIENT
Start: 2025-01-03

## 2025-01-03 NOTE — TELEPHONE ENCOUNTER
Patient is out of medication routing high priority     07/01/2024-    Gabapentin       (Dose adjustment)    Protocol Details    600 mg Oral 3 times daily    Protocol Details    Requested Prescriptions   Pending Prescriptions Disp Refills    gabapentin 600 MG Oral Tab 270 tablet 1     Sig: Take 1 tablet (600 mg total) by mouth 3 (three) times daily.       Neurology Medications Passed - 1/3/2025  8:37 AM        Passed - In person appointment or virtual visit in the past 6 mos or appointment in next 3 mos     Recent Outpatient Visits              1 month ago COVID-19 virus infection    [facility] Rigoberto Burton DO    Telemedicine    2 months ago Hypertension with goal blood pressure less than 140/90    [facility] Latanya Velazquez DO    Office Visit    12 months ago Encounter for routine adult health examination without abnormal findings    [facility] Latanya Velazquez DO    Office Visit    1 year ago Eczema, unspecified type    [facility] Latanya Velazquez DO    Telemedicine    2 years ago Encounter for routine adult health examination without abnormal findings    [facility] Latanya Velazquez DO    Office Visit          Future Appointments         Provider Department Appt Notes    In 2 weeks Latanya Velazquez DO [facility]                            Future Appointments         Provider Department Appt Notes    In 2 weeks Latanya Velazquez DO [facility]           Recent Outpatient Visits              1 month ago COVID-19 virus infection    [facility] Rigoberto Burton DO    Telemedicine    2 months ago Hypertension with goal blood pressure less than 140/90    [facility]  Latanya Velazquez,     Office Visit    12 months ago Encounter for routine adult health examination without abnormal findings    [facility] Latanya Velazquez,     Office Visit    1 year ago Eczema, unspecified type    [facility]     Telemedicine    2 years ago Encounter for routine adult health examination without abnormal findings    [facility] Latanya Velazquez,     Office Visit

## 2025-01-03 NOTE — TELEPHONE ENCOUNTER
Incoming call from patient requesting a refill for gabapentin 600 MG Oral Tab .  Please send RX to pharmacy on file .

## 2025-01-13 ENCOUNTER — NURSE ONLY (OUTPATIENT)
Facility: CLINIC | Age: 50
End: 2025-01-13

## 2025-01-13 NOTE — PROGRESS NOTES
Patient never returned my call   It is now the end of the office day  If patient calls back, please assist with rescheduling telephone screening appointment.  Encounter closed.

## 2025-01-13 NOTE — PROGRESS NOTES
I tried to call patient 3rd time, still no answer.  I already left him a voicemail to call be back today.

## 2025-01-18 DIAGNOSIS — I10 HYPERTENSION WITH GOAL BLOOD PRESSURE LESS THAN 140/90: ICD-10-CM

## 2025-01-22 ENCOUNTER — OFFICE VISIT (OUTPATIENT)
Facility: CLINIC | Age: 50
End: 2025-01-22
Payer: COMMERCIAL

## 2025-01-22 VITALS
OXYGEN SATURATION: 98 % | HEIGHT: 74 IN | DIASTOLIC BLOOD PRESSURE: 78 MMHG | BODY MASS INDEX: 25.03 KG/M2 | WEIGHT: 195 LBS | HEART RATE: 70 BPM | SYSTOLIC BLOOD PRESSURE: 122 MMHG

## 2025-01-22 DIAGNOSIS — Z23 NEED FOR VACCINATION: ICD-10-CM

## 2025-01-22 DIAGNOSIS — F41.9 ANXIETY: ICD-10-CM

## 2025-01-22 DIAGNOSIS — M21.611 BUNION OF RIGHT FOOT: ICD-10-CM

## 2025-01-22 DIAGNOSIS — Z00.00 ENCOUNTER FOR ROUTINE ADULT HEALTH EXAMINATION WITHOUT ABNORMAL FINDINGS: Primary | ICD-10-CM

## 2025-01-22 DIAGNOSIS — I10 HYPERTENSION WITH GOAL BLOOD PRESSURE LESS THAN 140/90: ICD-10-CM

## 2025-01-22 DIAGNOSIS — E78.2 MIXED HYPERLIPIDEMIA: ICD-10-CM

## 2025-01-22 PROBLEM — R74.8 ELEVATED LIVER ENZYMES: Status: RESOLVED | Noted: 2018-11-29 | Resolved: 2025-01-22

## 2025-01-22 PROCEDURE — 90471 IMMUNIZATION ADMIN: CPT | Performed by: FAMILY MEDICINE

## 2025-01-22 PROCEDURE — 99213 OFFICE O/P EST LOW 20 MIN: CPT | Performed by: FAMILY MEDICINE

## 2025-01-22 PROCEDURE — 99396 PREV VISIT EST AGE 40-64: CPT | Performed by: FAMILY MEDICINE

## 2025-01-22 PROCEDURE — 90677 PCV20 VACCINE IM: CPT | Performed by: FAMILY MEDICINE

## 2025-01-22 RX ORDER — METOPROLOL SUCCINATE 50 MG/1
75 TABLET, EXTENDED RELEASE ORAL DAILY
Qty: 135 TABLET | Refills: 3 | OUTPATIENT
Start: 2025-01-22

## 2025-01-22 RX ORDER — SODIUM BICARBONATE 650 MG/1
TABLET ORAL
COMMUNITY

## 2025-01-22 RX ORDER — METOPROLOL SUCCINATE 50 MG/1
50 TABLET, EXTENDED RELEASE ORAL DAILY
COMMUNITY

## 2025-01-22 RX ORDER — GABAPENTIN 600 MG/1
600 TABLET ORAL 2 TIMES DAILY
COMMUNITY
Start: 2025-01-22
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
## 2025-02-18 RX ORDER — HYDROCHLOROTHIAZIDE 12.5 MG/1
12.5 TABLET ORAL DAILY
Qty: 90 TABLET | Refills: 3 | Status: SHIPPED | OUTPATIENT
Start: 2025-02-18

## 2025-02-27 NOTE — TELEPHONE ENCOUNTER
Refill Per Protocol     Requested Prescriptions   Pending Prescriptions Disp Refills    SERTRALINE 50 MG Oral Tab [Pharmacy Med Name: SERTRALINE HCL 50 MG TABLET] 135 tablet 1     Sig: TAKE 1 AND 1/2 TABLETS BY MOUTH DAILY       Psychiatric Non-Scheduled (Anti-Anxiety) Passed - 2/27/2025  3:22 PM        Passed - In person appointment or virtual visit in the past 6 mos or appointment in next 3 mos     Recent Outpatient Visits              1 month ago Encounter for routine adult health examination without abnormal findings    [facility]    Office Visit    1 month ago     [facility]    3 months ago COVID-19 virus infection    [facility]    Telemedicine    4 months ago Hypertension with goal blood pressure less than 140/90    [facility]    Office Visit    1 year ago Encounter for routine adult health examination without abnormal findings    [facility] Latanya Velazquez DO    Office Visit                      Passed - Depression Screening completed within the past 12 months        Passed - Medication is active on med list

## 2025-03-14 RX ORDER — METOPROLOL SUCCINATE 50 MG/1
50 TABLET, EXTENDED RELEASE ORAL DAILY
Qty: 90 TABLET | Refills: 3 | Status: SHIPPED | OUTPATIENT
Start: 2025-03-14

## 2025-04-21 RX ORDER — TRIAMCINOLONE ACETONIDE 1 MG/G
1 OINTMENT TOPICAL 2 TIMES DAILY
Qty: 80 G | Refills: 1 | Status: SHIPPED | OUTPATIENT
Start: 2025-04-21

## 2025-04-21 RX ORDER — ATORVASTATIN CALCIUM 20 MG/1
20 TABLET, FILM COATED ORAL NIGHTLY
Qty: 90 TABLET | Refills: 3 | Status: SHIPPED | OUTPATIENT
Start: 2025-04-21

## 2025-05-30 RX ORDER — GABAPENTIN 600 MG/1
300 TABLET ORAL 2 TIMES DAILY
Qty: 90 TABLET | Refills: 1 | Status: CANCELLED | OUTPATIENT
Start: 2025-05-30

## 2025-05-30 RX ORDER — GABAPENTIN 100 MG/1
200 CAPSULE ORAL 2 TIMES DAILY
Qty: 360 CAPSULE | Refills: 0 | Status: SHIPPED | OUTPATIENT
Start: 2025-05-30

## 2025-05-30 NOTE — TELEPHONE ENCOUNTER
Patient called, verified Name and . Following up on refill of gabapentin. Reviewed Dr. Velazquez's message below and states he agreed to PCP's dosing recommendation. However, when he tried picking up the medication from the pharmacy, he was told that it needs clearance from the doctor.     Called [facility], verified patient's Name and . States gabapentin 400-mg dose in tablet form is not covered by the patient's insurance. If dosing for 200 mg twice a day is required, a prescription for 100-mg capsule is needed which is what is covered by insurance.    Dr. Velazquez please see pended prescription for review and approval.

## 2025-05-30 NOTE — TELEPHONE ENCOUNTER
Received a call from patient to ask if the medication can be expedite , per patient he will be out of town and needs his prescription today .    Please assist .

## 2025-05-30 NOTE — TELEPHONE ENCOUNTER
Called patient to clarify how he was taking Gabapentin and how it was going. Doing well on 300 mg twice daily so will lower to 200 mg twice daily and sent 400 mg tablets to take it this way. He will let me know if any problems taking it this way and follow-up in the next 3-6 months.

## 2025-05-30 NOTE — TELEPHONE ENCOUNTER
Please review: medication fails/has no protocol attached.  Medication request is marked high priority: per patient leaving for out of town today. Patient needs refill.    Dr. Velazquez - please advise if the directions need to be adjusted, as patient is tapering.    Patient comment: Im out (thought theybwere ready yesterday but it was others) and need them tomorrow. Can you put this in friday morning?     No future appointments with primary care medicine.

## 2025-05-30 NOTE — TELEPHONE ENCOUNTER
Per 1/22/25 office visit with Dr. Velazquez:    He has continued to take Gabapentin 600 mg three times a day, but not sure why he is on it at this point.     Anxiety well-controlled overall, and will continue sertraline 75 mg daily and start weaning off of gabapentin. Will start by taking gabapentin 600 mg twice daily for the next month, and continue to taper from there depending on how he is doing.

## 2025-06-02 ENCOUNTER — TELEPHONE (OUTPATIENT)
Facility: CLINIC | Age: 50
End: 2025-06-02

## 2025-08-26 ENCOUNTER — TELEPHONE (OUTPATIENT)
Facility: CLINIC | Age: 50
End: 2025-08-26

## 2025-08-29 RX ORDER — GABAPENTIN 100 MG/1
200 CAPSULE ORAL 2 TIMES DAILY
Qty: 360 CAPSULE | Refills: 0 | OUTPATIENT
Start: 2025-08-29

## 2025-08-29 RX ORDER — GABAPENTIN 100 MG/1
200 CAPSULE ORAL 2 TIMES DAILY
Qty: 360 CAPSULE | Refills: 0 | Status: SHIPPED | OUTPATIENT
Start: 2025-08-29

## (undated) DIAGNOSIS — I10 HYPERTENSION WITH GOAL BLOOD PRESSURE LESS THAN 140/90: ICD-10-CM